# Patient Record
Sex: FEMALE | Race: BLACK OR AFRICAN AMERICAN | NOT HISPANIC OR LATINO | Employment: UNEMPLOYED | URBAN - METROPOLITAN AREA
[De-identification: names, ages, dates, MRNs, and addresses within clinical notes are randomized per-mention and may not be internally consistent; named-entity substitution may affect disease eponyms.]

---

## 2021-08-21 ENCOUNTER — HISTORICAL (OUTPATIENT)
Dept: INFECTIOUS DISEASES | Facility: HOSPITAL | Age: 26
End: 2021-08-21

## 2022-04-10 ENCOUNTER — HISTORICAL (OUTPATIENT)
Dept: ADMINISTRATIVE | Facility: HOSPITAL | Age: 27
End: 2022-04-10

## 2022-04-28 VITALS
SYSTOLIC BLOOD PRESSURE: 128 MMHG | DIASTOLIC BLOOD PRESSURE: 75 MMHG | HEIGHT: 65 IN | WEIGHT: 273.94 LBS | BODY MASS INDEX: 45.64 KG/M2

## 2022-12-31 LAB — GLUCOSE SERPL-MCNC: 229 MG/DL

## 2023-01-04 LAB
ABO AND RH: NORMAL
C TRACH RRNA SPEC QL PROBE: NEGATIVE
HBV SURFACE AG SERPL QL IA: NEGATIVE
HIV 1+2 AB+HIV1 P24 AG SERPL QL IA: NORMAL
N GONORRHOEA DNA SPEC QL NAA+PROBE: NEGATIVE
RPR: NEGATIVE
RUBELLA IMMUNE STATUS: NORMAL

## 2023-01-12 LAB
PRENATAL STREP B CULTURE: POSITIVE
PRENATAL STREP B CULTURE: POSITIVE

## 2023-05-23 VITALS
WEIGHT: 293 LBS | SYSTOLIC BLOOD PRESSURE: 142 MMHG | DIASTOLIC BLOOD PRESSURE: 86 MMHG | BODY MASS INDEX: 48.82 KG/M2 | HEIGHT: 65 IN

## 2023-05-23 DIAGNOSIS — O99.283 HYPOTHYROIDISM DURING PREGNANCY IN THIRD TRIMESTER: ICD-10-CM

## 2023-05-23 DIAGNOSIS — O99.210 OBESITY IN PREGNANCY: ICD-10-CM

## 2023-05-23 DIAGNOSIS — O10.919 HTN IN PREGNANCY, CHRONIC: ICD-10-CM

## 2023-05-23 DIAGNOSIS — E03.9 HYPOTHYROIDISM DURING PREGNANCY IN THIRD TRIMESTER: ICD-10-CM

## 2023-05-23 DIAGNOSIS — O24.113 PRE-EXISTING TYPE 2 DIABETES MELLITUS DURING PREGNANCY IN THIRD TRIMESTER: ICD-10-CM

## 2023-05-23 DIAGNOSIS — O34.219 DELIVERY WITH HISTORY OF C-SECTION: ICD-10-CM

## 2023-05-23 DIAGNOSIS — O24.113 PRE-EXISTING TYPE 2 DIABETES MELLITUS DURING PREGNANCY IN THIRD TRIMESTER: Primary | ICD-10-CM

## 2023-05-23 RX ORDER — LEVOTHYROXINE SODIUM 25 UG/1
25 TABLET ORAL
Status: ON HOLD | COMMUNITY
End: 2023-06-24 | Stop reason: SDUPTHER

## 2023-05-23 RX ORDER — LABETALOL 100 MG/1
100 TABLET, FILM COATED ORAL 2 TIMES DAILY
COMMUNITY
Stop reason: DRUGHIGH

## 2023-05-23 RX ORDER — SYRINGE,SAFETY WITH NEEDLE,1ML 25GX1"
SYRINGE (EA) MISCELLANEOUS
COMMUNITY

## 2023-05-23 RX ORDER — NAPROXEN SODIUM 220 MG/1
81 TABLET, FILM COATED ORAL DAILY
COMMUNITY

## 2023-05-23 NOTE — PROGRESS NOTES
23    27-year-old female  at 30 6/7 weeks gestation with EDC of 2023 by LMP, consistent with early US. Her chronic medical, obstetrical or fetal conditions that affect the pregnancy include the following: She has type 2 diabetes. She was recently hospitalized and discharged on 38NPH and 24 Humalog in am, 24 Hum with dinner and 24 NPH at bedtime. She also has corrective dose of 1 unit humalog for every 5 mg above 130. She brought a log for review.On 23, Hgba1c at 7.3%. She missed fetal echo appointment and states she has not had a chance to reschedule.She has CHTN. She is on labetalol 100mg bid and low dose aspirin TWICE daily. 1-10-23, 24 hour urine with 117.7mg protein. She had elevated BMI over 50 at consult visit.On 23,TSH at 3.188. She was advised to startsynthroid 25 mcg daily, but she reports that she was not given that medication from the pharmacy.Currently, she voices no complaints and denies any abdominal cramping, leaking of fluid, vaginal bleeding or vaginal discharge. Patient also reports no headaches, visual problems or epigastric pain. FOB present for visit. Patient is having rib and back pain.  Diagnosis & Plan  1) Z3A.30 Weeks of gestation; 30 weeks gestation of pregnancy  2) O99.213 Other maternal diseases classifiable elsewhere but complicating pregnancy, childbirth and the puerperium; Obesity complicating pregnancy, third trimester  3) O24.113 Diabetes mellitus in pregnancy, childbirth, and the puerperium; Pre-existing type 2 diabetes mellitus, in pregnancy, third trimester  4) O10.013 Pre-existing hypertension complicating pregnancy, childbirth and the puerperium; Pre-existing essential hypertension complicating pregnancy, third trimester  5) E66.01 Overweight and obesity; Morbid (severe) obesity due to excess calories  6) O99.283 Other maternal diseases classifiable elsewhere but complicating pregnancy, childbirth and the puerperium; Endocrine, nutritional and  metabolic diseases complicating pregnancy, third trimester  General Plan  30 6/7 weeks gestation with    1. Type 2 diabetes with excessive fetal fetal growth (2165g at 79%, AC greater than 97% on 23), normal IVON (14.1cm), BPP     Reviewed risks associated with diabetes in pregnancy including higher risk for polyhydramnios, fetal macrosomia and  metabolic complications (hypoglycemia, hyperbilirubinemia, hypocalcemia, erythema).    With risk of congenital heart defect, and poor views of heart recommended fetal echo. She cancelled appt again, urged to call and reschedule and KEEP APPOINTMENT    Continue 2200 calorie ADA diet during pregnancy. It is recommended that she have 30 grams of carbohydrates with breakfast, and 60 grams with lunch and dinner. In addition, she should have three snacks in between meals with 15 grams of carbohydrates and at bedtime with 30 grams of carbohydrates.  Dietary advice reviewed again    No log for review as she is not checking. We contacted pharmacy to ensure she will be able to get glucometer and insulin today. Urged her to avoid going without any amount of time without insulin or being able to monitor glucose. Advised to adjust to 38 NPH and 24 Humalog in am, 24 Hum with dinner and 36 NPH at bedtime. She was give corrective dose of 1 unit humalog for every 5 mg above 130.    Discussed risks of hyperglycemia, including risk of FDIU. With previous discrepancy of blood glucose values and reported values and noncompliance, recommended she go to hospital today for glycemic control. She declined and voiced understanding of risks of uncontrolled diabetes. Advised to continue to follow strict diabetic diet at all times. Advised to check glucose 4/day and CALL DAILY with values.    Continue low dose ASA BID until 34-35 weeks, then return to once daily dosing after that until delivery.    She would benefit from having serial US every four weeks until the end of the pregnancy in  view of the risk of excessive fetal growth and normalization of fetal growth being a reflection of adequacy see of blood sugar control. Recommend fetal testing at this time, increased to twice weekly, alternating with primary OB at 32 weeks.    She needs to do fetal kick counts throughout the pregnancy, with immediate reporting of any decreased fetal movement.    2. chronic hypertension  Chronic hypertension complicates up to 2-5% of pregnancies and is associated with significant adverse pregnancy outcomes including  birth, fetal growth restriction, fetal demise, placental abruption, and  delivery.    With blood pressure 142/86, she was advised to continue labetalol 100mg bid. Continue low sodium diet avoiding any excessive weight gain.    Continue low dose asa as above. Reviewed preeclampsia precautions.    See fetal surveillance above.    Among patients with uncomplicated chronic hypertension with no additional risk factors, delivery at 38-39 weeks appears to provide optimal balance between the risk of adverse fetal and  outcomes. HOWEVER, with multiple risks, will reassess closer to EDC to determine optimal timeframe or GA for delivery, based on current evaluation at that time.    3. elevated BMI over 50 at consult visit, previous excessive gain, currently stable    Continue to follow low calorie, low fat, low Na, diabetic diet avoiding any additional excessive weight gain. Excess weight gain would be associated with worsening hypertension, worsening diabetes and adverse  outcomes, including fetal demise in utero.    The risks of  section in the massively obese patient (BMI greater than 50) could be around 60%. The risk of complications in these  sections is substantial, including 30% risk of wound complications, mostly in the form of wound disruptions, with majority (85%) diagnosed after hospital discharge.    Preoperative antibiotics and thromboprophylaxis with  use if pneumatic compression devices is recommended in those undergoing  delivery. Thromboprophylaxis should also be use with those on prolonged immobilization.    4. Hypothyroidism  Reviewed with her the likelihood of higher dose of Synthroid to be needed during the pregnancy with maternal hypothyroidism. The importance of maintaining a euthyroid status for optimal pregnancy outcome was discussed to decrease the risk of adverse pregnancy outcome with uncontrolled hypothyroidism. Discussed that thyroxine (T4) is needed for fetal brain development. Also, high hcg levels in first trimester, can cause decreased TSH and need to adjust accordingly. Risks associated with uncontrolled hypothyroidism, include potential for pregnancy loss, neurologic/cognitive deficit in the baby,  delivery, LBW,  delivery, and gestational hypertension/preeclampsia    Currently, it is recommended to do trimester specific thyroid testing monitoring in pregnancy with TSH for adequate management and dosing of medication. Thyroid levels for  1st trimester: TSH - 0.1- 2.5  2nd trimester: TSH 0.2- 3.0  3rd trimester: TSH 0.3- 3.0  Free T4 levels may be unreliable in pregnancy, in addition to varying lab reference ranges. Although, abnormality 1.5 fold outside of reference range is suggestive of abnormality.    The prognosis should be quite good with adequately controlled hypothyroidism on medicine.    Urged to continue Synthroid 25mcg daily.    Recommend recheck TSH in about 3 weeks. If it is normal, then I suggest rechecking the TSH every two months in the pregnancy and if adjustment of dose of Synthroid is needed, then a repeat TSH could be done in three weeks after that. She was instructed to report any symptoms of cold/heat intolerance, heart palpitations, fatigue or constipation    5. Muskuloskeletal pain, supportive care measures discussed.  Return Visit:- 1 Week    -Reason:- MFM Follow Up BPP

## 2023-06-06 ENCOUNTER — TELEPHONE (OUTPATIENT)
Dept: MATERNAL FETAL MEDICINE | Facility: CLINIC | Age: 28
End: 2023-06-06

## 2023-06-06 ENCOUNTER — PROCEDURE VISIT (OUTPATIENT)
Dept: MATERNAL FETAL MEDICINE | Facility: CLINIC | Age: 28
End: 2023-06-06
Payer: MEDICAID

## 2023-06-06 ENCOUNTER — OFFICE VISIT (OUTPATIENT)
Dept: MATERNAL FETAL MEDICINE | Facility: CLINIC | Age: 28
End: 2023-06-06
Payer: MEDICAID

## 2023-06-06 VITALS
BODY MASS INDEX: 48.82 KG/M2 | HEART RATE: 83 BPM | HEIGHT: 65 IN | SYSTOLIC BLOOD PRESSURE: 150 MMHG | DIASTOLIC BLOOD PRESSURE: 95 MMHG | WEIGHT: 293 LBS

## 2023-06-06 DIAGNOSIS — O99.213 OBESITY COMPLICATING PREGNANCY IN THIRD TRIMESTER: ICD-10-CM

## 2023-06-06 DIAGNOSIS — O99.283 HYPOTHYROIDISM DURING PREGNANCY IN THIRD TRIMESTER: ICD-10-CM

## 2023-06-06 DIAGNOSIS — O99.210 OBESITY IN PREGNANCY: ICD-10-CM

## 2023-06-06 DIAGNOSIS — O10.919 HTN IN PREGNANCY, CHRONIC: Primary | ICD-10-CM

## 2023-06-06 DIAGNOSIS — O10.013 PRE-EXISTING ESSENTIAL HYPERTENSION COMPLICATING PREGNANCY IN THIRD TRIMESTER: ICD-10-CM

## 2023-06-06 DIAGNOSIS — O16.3 HYPERTENSION COMPLICATING PREGNANCY, THIRD TRIMESTER: ICD-10-CM

## 2023-06-06 DIAGNOSIS — O10.919 HTN IN PREGNANCY, CHRONIC: ICD-10-CM

## 2023-06-06 DIAGNOSIS — E03.9 HYPOTHYROIDISM DURING PREGNANCY IN THIRD TRIMESTER: ICD-10-CM

## 2023-06-06 DIAGNOSIS — O34.219 DELIVERY WITH HISTORY OF C-SECTION: ICD-10-CM

## 2023-06-06 DIAGNOSIS — O24.113 PRE-EXISTING TYPE 2 DIABETES MELLITUS DURING PREGNANCY IN THIRD TRIMESTER: ICD-10-CM

## 2023-06-06 DIAGNOSIS — O35.BXX1: ICD-10-CM

## 2023-06-06 DIAGNOSIS — O24.113 PRE-EXISTING TYPE 2 DIABETES MELLITUS DURING PREGNANCY IN THIRD TRIMESTER: Primary | ICD-10-CM

## 2023-06-06 PROCEDURE — 3080F PR MOST RECENT DIASTOLIC BLOOD PRESSURE >= 90 MM HG: ICD-10-PCS | Mod: CPTII,S$GLB,, | Performed by: OBSTETRICS & GYNECOLOGY

## 2023-06-06 PROCEDURE — 76819 PR US, OB, FETAL BIOPHYSICAL, W/O NST: ICD-10-PCS | Mod: S$GLB,,, | Performed by: OBSTETRICS & GYNECOLOGY

## 2023-06-06 PROCEDURE — 99214 PR OFFICE/OUTPT VISIT, EST, LEVL IV, 30-39 MIN: ICD-10-PCS | Mod: TH,S$GLB,, | Performed by: OBSTETRICS & GYNECOLOGY

## 2023-06-06 PROCEDURE — 76816 PR  US,PREGNANT UTERUS,F/U,TRANSABD APP: ICD-10-PCS | Mod: S$GLB,,, | Performed by: OBSTETRICS & GYNECOLOGY

## 2023-06-06 PROCEDURE — 3008F BODY MASS INDEX DOCD: CPT | Mod: CPTII,S$GLB,, | Performed by: OBSTETRICS & GYNECOLOGY

## 2023-06-06 PROCEDURE — 1159F PR MEDICATION LIST DOCUMENTED IN MEDICAL RECORD: ICD-10-PCS | Mod: CPTII,S$GLB,, | Performed by: OBSTETRICS & GYNECOLOGY

## 2023-06-06 PROCEDURE — 3077F SYST BP >= 140 MM HG: CPT | Mod: CPTII,S$GLB,, | Performed by: OBSTETRICS & GYNECOLOGY

## 2023-06-06 PROCEDURE — 76819 FETAL BIOPHYS PROFIL W/O NST: CPT | Mod: S$GLB,,, | Performed by: OBSTETRICS & GYNECOLOGY

## 2023-06-06 PROCEDURE — 3077F PR MOST RECENT SYSTOLIC BLOOD PRESSURE >= 140 MM HG: ICD-10-PCS | Mod: CPTII,S$GLB,, | Performed by: OBSTETRICS & GYNECOLOGY

## 2023-06-06 PROCEDURE — 3080F DIAST BP >= 90 MM HG: CPT | Mod: CPTII,S$GLB,, | Performed by: OBSTETRICS & GYNECOLOGY

## 2023-06-06 PROCEDURE — 76816 OB US FOLLOW-UP PER FETUS: CPT | Mod: S$GLB,,, | Performed by: OBSTETRICS & GYNECOLOGY

## 2023-06-06 PROCEDURE — 1159F MED LIST DOCD IN RCRD: CPT | Mod: CPTII,S$GLB,, | Performed by: OBSTETRICS & GYNECOLOGY

## 2023-06-06 PROCEDURE — 99214 OFFICE O/P EST MOD 30 MIN: CPT | Mod: TH,S$GLB,, | Performed by: OBSTETRICS & GYNECOLOGY

## 2023-06-06 PROCEDURE — 3008F PR BODY MASS INDEX (BMI) DOCUMENTED: ICD-10-PCS | Mod: CPTII,S$GLB,, | Performed by: OBSTETRICS & GYNECOLOGY

## 2023-06-06 RX ORDER — LABETALOL 200 MG/1
200 TABLET, FILM COATED ORAL EVERY 12 HOURS
Qty: 60 TABLET | Refills: 3 | Status: ON HOLD | OUTPATIENT
Start: 2023-06-06 | End: 2023-06-24 | Stop reason: SDUPTHER

## 2023-06-06 NOTE — ASSESSMENT & PLAN NOTE
Chronic hypertension  Chronic hypertension complicates up to 2-5% of pregnancies and is associated with significant adverse pregnancy outcomes including  birth, fetal growth restriction, fetal demise, placental abruption, and  delivery.     With blood pressure today 150/95, she was advised to adjust labetalol to 200mg bid. Continue low sodium diet avoiding any excessive weight gain.     Continue low dose asa as above. Reviewed preeclampsia precautions.     See fetal surveillance above.     With chronic hypertension requiring higher dose of medication and type 2 diabetes mellitus with BMI over 55 recommend delivery no later than 37 weeks' gestation.  Place of delivery will depend on evaluation by pediatric Cardiology for fetal heart.

## 2023-06-06 NOTE — ASSESSMENT & PLAN NOTE
Continued excessive fetal growth (3436g at 85%, AC 99% on 23), Normal IVON, BPP 8/8. 4 chamber heart view with left ventricle appearing smaller than right ventricle.     Reviewed risks associated with diabetes in pregnancy including higher risk for polyhydramnios, fetal macrosomia and  metabolic complications (hypoglycemia, hyperbilirubinemia, hypocalcemia, erythema).    With risk of congenital heart defect, and poor views of heart recommended fetal echo. She cancelled appt again, urged to call and reschedule and KEEP APPOINTMENT    Continue 2200 calorie ADA diet during pregnancy. It is recommended that she have 30 grams of carbohydrates with breakfast, and 60 grams with lunch and dinner. In addition, she should have three snacks in between meals with 15 grams of carbohydrates and at bedtime with 30 grams of carbohydrates.  Dietary advice reviewed again    No log for review as she is not checking. We contacted pharmacy to ensure she will be able to get glucometer and insulin today. Urged her to avoid going without any amount of time without insulin or being able to monitor glucose. Advised to adjust to 38 NPH and 24 Humalog in am, 24 Hum with dinner and 44 NPH at bedtime. She was give corrective dose of 1 unit humalog for every 5 mg above 130.    Discussed risks of hyperglycemia, including risk of FDIU. With previous discrepancy of blood glucose values and reported values and noncompliance, recommended she go to hospital today for glycemic control. She declined and voiced understanding of risks of uncontrolled diabetes. Advised to continue to follow strict diabetic diet at all times. Advised to check glucose 4/day and CALL DAILY with values.    Will decrease low-dose aspirin to daily until delivery.

## 2023-06-06 NOTE — ASSESSMENT & PLAN NOTE
Elevated BMI over 50 at consult visit now at 55  Previous excessive gain, currently stable     Continue to follow low calorie, low fat, low Na, diabetic diet avoiding any additional excessive weight gain. Excess weight gain would be associated with worsening hypertension, worsening diabetes and adverse  outcomes, including fetal demise in utero.     The risks of  section in the massively obese patient (BMI greater than 50) could be around 60%. The risk of complications in these  sections is substantial, including 30% risk of wound complications, mostly in the form of wound disruptions, with majority (85%) diagnosed after hospital discharge.     Preoperative antibiotics and thromboprophylaxis with use if pneumatic compression devices is recommended in those undergoing  delivery. Thromboprophylaxis should also be use with those on prolonged immobilization.

## 2023-06-06 NOTE — PROGRESS NOTES
"Maternal Fetal Medicine follow up consult    Subjective     Patient ID: Estephanie Johansen is a 28 y.o. female  at 34w6d gestation with Estimated Date of Delivery: 23  who is here for follow  up consultation by MFM.    Chief Complaint: MFM FOLLOW UP W/REPEAT US       Pregnancy complications include:   Patient Active Problem List   Diagnosis    Pre-existing essential hypertension complicating pregnancy in third trimester    Pre-existing type 2 diabetes mellitus during pregnancy in third trimester    Significantly increased BMI complicating pregnancy in third trimester    Hypothyroidism during pregnancy in third trimester    Delivery with history of     Congenital heart disease of fetus affecting antepartum care of mother, fetus 1        No changes to medical, surgical, family, social, or obstetric history.    Interval history since last MFM visit: None.    HPI She denies any leaking fluid, vaginal bleeding, contractions, decreased fetal movement. Denies headaches, visual disturbances, or epigastric pain    Medications:  Current Outpatient Medications   Medication Instructions    aspirin 81 mg, Oral, Daily    blood sugar diagnostic (TRUE METRIX GLUCOSE TEST STRIP MISC) Misc.(Non-Drug; Combo Route)    insulin lispro protamine-insulin lispro (HUMALOG 75/25) 100 unit/mL (75-25) Susp Subcutaneous, Inject 20 units in AM and inject 26 units at supper    insulin regular 100 unit/mL Inj injection Subcutaneous, inject 46 units in AM and inject 54 units at bedtime    insulin syringe-needle U-100 1 mL 29 gauge x 1/2" Syrg Misc.(Non-Drug; Combo Route)    LANCETS,THIN MISC Misc.(Non-Drug; Combo Route)    levothyroxine (SYNTHROID) 25 mcg, Oral, Before breakfast       Review of Systems   Constitutional:  Negative for activity change.   Eyes:  Negative for visual disturbance.   Respiratory: Negative.     Cardiovascular:  Negative for leg swelling.   Gastrointestinal:  Negative for abdominal pain, constipation, " diarrhea, nausea, vomiting and reflux.   Genitourinary:  Negative for bladder incontinence, frequency, pelvic pain, vaginal bleeding and vaginal discharge.        Good fetal movements   Musculoskeletal:  Negative for back pain.   Neurological:  Negative for headaches.   All other systems reviewed and are negative.        Objective     Physical Exam       Assessment and Plan     ASSESSMENT/PLAN:     28 y.o.  female with IUP at 34w6d    Pre-existing type 2 diabetes mellitus during pregnancy in third trimester  Continued excessive fetal growth (3436g at 85%, AC 99% on 23), Normal IVON, BPP 8/8. 4 chamber heart view with left ventricle appearing smaller than right ventricle.     Reviewed risks associated with diabetes in pregnancy including higher risk for polyhydramnios, fetal macrosomia and  metabolic complications (hypoglycemia, hyperbilirubinemia, hypocalcemia, erythema).    With risk of congenital heart defect, and poor views of heart recommended fetal echo. She cancelled appt again, urged to call and reschedule and KEEP APPOINTMENT    Continue 2200 calorie ADA diet during pregnancy. It is recommended that she have 30 grams of carbohydrates with breakfast, and 60 grams with lunch and dinner. In addition, she should have three snacks in between meals with 15 grams of carbohydrates and at bedtime with 30 grams of carbohydrates.  Dietary advice reviewed again    No log for review as she is not checking. We contacted pharmacy to ensure she will be able to get glucometer and insulin today. Urged her to avoid going without any amount of time without insulin or being able to monitor glucose. Advised to adjust to 38 NPH and 24 Humalog in am, 24 Hum with dinner and 44 NPH at bedtime. She was give corrective dose of 1 unit humalog for every 5 mg above 130.    Discussed risks of hyperglycemia, including risk of FDIU. With previous discrepancy of blood glucose values and reported values and noncompliance,  recommended she go to hospital today for glycemic control. She declined and voiced understanding of risks of uncontrolled diabetes. Advised to continue to follow strict diabetic diet at all times. Advised to check glucose 4/day and CALL DAILY with values.    Will decrease low-dose aspirin to daily until delivery.      Pre-existing essential hypertension complicating pregnancy in third trimester  Chronic hypertension  Chronic hypertension complicates up to 2-5% of pregnancies and is associated with significant adverse pregnancy outcomes including  birth, fetal growth restriction, fetal demise, placental abruption, and  delivery.     With blood pressure today 150/95, she was advised to adjust labetalol to 200mg bid. Continue low sodium diet avoiding any excessive weight gain.     Continue low dose asa as above. Reviewed preeclampsia precautions.     See fetal surveillance above.     With chronic hypertension requiring higher dose of medication and type 2 diabetes mellitus with BMI over 55 recommend delivery no later than 37 weeks' gestation.  Place of delivery will depend on evaluation by pediatric Cardiology for fetal heart.    Significantly increased BMI complicating pregnancy in third trimester  Elevated BMI over 50 at consult visit now at 55  Previous excessive gain, currently stable     Continue to follow low calorie, low fat, low Na, diabetic diet avoiding any additional excessive weight gain. Excess weight gain would be associated with worsening hypertension, worsening diabetes and adverse  outcomes, including fetal demise in utero.     The risks of  section in the massively obese patient (BMI greater than 50) could be around 60%. The risk of complications in these  sections is substantial, including 30% risk of wound complications, mostly in the form of wound disruptions, with majority (85%) diagnosed after hospital discharge.     Preoperative antibiotics and  thromboprophylaxis with use if pneumatic compression devices is recommended in those undergoing  delivery. Thromboprophylaxis should also be use with those on prolonged immobilization.    Hypothyroidism during pregnancy in third trimester  Reviewed with her the likelihood of higher dose of Synthroid to be needed during the pregnancy with maternal hypothyroidism. The importance of maintaining a euthyroid status for optimal pregnancy outcome was discussed to decrease the risk of adverse pregnancy outcome with uncontrolled hypothyroidism. Discussed that thyroxine (T4) is needed for fetal brain development. Also, high hcg levels in first trimester, can cause decreased TSH and need to adjust accordingly. Risks associated with uncontrolled hypothyroidism, include potential for pregnancy loss, neurologic/cognitive deficit in the baby,  delivery, LBW,  delivery, and gestational hypertension/preeclampsia     Currently, it is recommended to do trimester specific thyroid testing monitoring in pregnancy with TSH for adequate management and dosing of medication. Thyroid levels for  1st trimester: TSH - 0.1- 2.5  2nd trimester: TSH 0.2- 3.0  3rd trimester: TSH 0.3- 3.0  Free T4 levels may be unreliable in pregnancy, in addition to varying lab reference ranges. Although, abnormality 1.5 fold outside of reference range is suggestive of abnormality.     The prognosis should be quite good with adequately controlled hypothyroidism on medicine.     Advised to continue Synthroid 25mcg daily.      RECOMMEND TSH IF NOT DONE RECENTLY.  She was instructed to report any symptoms of cold/heat intolerance, heart palpitations, fatigue or constipation    Congenital heart disease of fetus affecting antepartum care of mother, fetus 1  LEFT VENTRICLE APPEARS TO BE SMALLER THAN THE RIGHT VENTRICLE.  WE HAVE NOT BEEN ABLE TO VISUALIZE FETAL HEART ON PREVIOUS ULTRASOUNDS AND HAVE RESCHEDULED FETAL ECHO SEVERAL TIMES.  URGED HER  TO DO THE FETAL ECHO AS SOON AS POSSIBLE.  DISCUSSED WITH DR. MAXWELL WILL TRY TO ACCOMMODATE HER IN VIEW OF THE ADVANCED GESTATIONAL AGE WITHIN THE NEXT FEW DAYS IF SHE AGREES TO GO.  EMPHASIZED THE IMPORTANCE OF HAVING CLARITY ON THE DIAGNOSIS OF FETAL CONDITION AND HEART AND THE PATIENT WOULD BENEFIT FROM DELIVERY IN A TERTIARY FACILITY WITH A MINIMUM OF NICU AND PEDIATRIC CARDIOLOGY IF HEART IS TRULY ABNORMAL.      F/u in 1 weeks for MFM visit  F/u in 1 weeks for US      Components of this note were documented using voice recognition systems; and are subject to errors not corrected at proof reading.  Please contact the author for any clarifications.

## 2023-06-06 NOTE — ASSESSMENT & PLAN NOTE
LEFT VENTRICLE APPEARS TO BE SMALLER THAN THE RIGHT VENTRICLE.  WE HAVE NOT BEEN ABLE TO VISUALIZE FETAL HEART ON PREVIOUS ULTRASOUNDS AND HAVE RESCHEDULED FETAL ECHO SEVERAL TIMES.  URGED HER TO DO THE FETAL ECHO AS SOON AS POSSIBLE.  DISCUSSED WITH DR. MAXWELL WILL TRY TO ACCOMMODATE HER IN VIEW OF THE ADVANCED GESTATIONAL AGE WITHIN THE NEXT FEW DAYS IF SHE AGREES TO GO.  EMPHASIZED THE IMPORTANCE OF HAVING CLARITY ON THE DIAGNOSIS OF FETAL CONDITION AND HEART AND THE PATIENT WOULD BENEFIT FROM DELIVERY IN A TERTIARY FACILITY WITH A MINIMUM OF NICU AND PEDIATRIC CARDIOLOGY IF HEART IS TRULY ABNORMAL.

## 2023-06-06 NOTE — ASSESSMENT & PLAN NOTE
Reviewed with her the likelihood of higher dose of Synthroid to be needed during the pregnancy with maternal hypothyroidism. The importance of maintaining a euthyroid status for optimal pregnancy outcome was discussed to decrease the risk of adverse pregnancy outcome with uncontrolled hypothyroidism. Discussed that thyroxine (T4) is needed for fetal brain development. Also, high hcg levels in first trimester, can cause decreased TSH and need to adjust accordingly. Risks associated with uncontrolled hypothyroidism, include potential for pregnancy loss, neurologic/cognitive deficit in the baby,  delivery, LBW,  delivery, and gestational hypertension/preeclampsia     Currently, it is recommended to do trimester specific thyroid testing monitoring in pregnancy with TSH for adequate management and dosing of medication. Thyroid levels for  1st trimester: TSH - 0.1- 2.5  2nd trimester: TSH 0.2- 3.0  3rd trimester: TSH 0.3- 3.0  Free T4 levels may be unreliable in pregnancy, in addition to varying lab reference ranges. Although, abnormality 1.5 fold outside of reference range is suggestive of abnormality.     The prognosis should be quite good with adequately controlled hypothyroidism on medicine.     Advised to continue Synthroid 25mcg daily.      RECOMMEND TSH IF NOT DONE RECENTLY.  She was instructed to report any symptoms of cold/heat intolerance, heart palpitations, fatigue or constipation

## 2023-06-13 ENCOUNTER — OFFICE VISIT (OUTPATIENT)
Dept: MATERNAL FETAL MEDICINE | Facility: CLINIC | Age: 28
End: 2023-06-13
Payer: MEDICAID

## 2023-06-13 ENCOUNTER — PROCEDURE VISIT (OUTPATIENT)
Dept: MATERNAL FETAL MEDICINE | Facility: CLINIC | Age: 28
End: 2023-06-13
Payer: MEDICAID

## 2023-06-13 VITALS
HEART RATE: 96 BPM | HEIGHT: 65 IN | SYSTOLIC BLOOD PRESSURE: 117 MMHG | BODY MASS INDEX: 48.82 KG/M2 | DIASTOLIC BLOOD PRESSURE: 77 MMHG | WEIGHT: 293 LBS

## 2023-06-13 DIAGNOSIS — O99.283 HYPOTHYROIDISM DURING PREGNANCY IN THIRD TRIMESTER: ICD-10-CM

## 2023-06-13 DIAGNOSIS — O10.919 HTN IN PREGNANCY, CHRONIC: ICD-10-CM

## 2023-06-13 DIAGNOSIS — O24.113 PRE-EXISTING TYPE 2 DIABETES MELLITUS DURING PREGNANCY IN THIRD TRIMESTER: Primary | ICD-10-CM

## 2023-06-13 DIAGNOSIS — E03.9 HYPOTHYROIDISM DURING PREGNANCY IN THIRD TRIMESTER: ICD-10-CM

## 2023-06-13 DIAGNOSIS — O10.013 PRE-EXISTING ESSENTIAL HYPERTENSION COMPLICATING PREGNANCY IN THIRD TRIMESTER: ICD-10-CM

## 2023-06-13 DIAGNOSIS — O99.213 OBESITY COMPLICATING PREGNANCY IN THIRD TRIMESTER: ICD-10-CM

## 2023-06-13 DIAGNOSIS — O35.BXX1: ICD-10-CM

## 2023-06-13 PROCEDURE — 1159F PR MEDICATION LIST DOCUMENTED IN MEDICAL RECORD: ICD-10-PCS | Mod: CPTII,S$GLB,, | Performed by: OBSTETRICS & GYNECOLOGY

## 2023-06-13 PROCEDURE — 1159F MED LIST DOCD IN RCRD: CPT | Mod: CPTII,S$GLB,, | Performed by: OBSTETRICS & GYNECOLOGY

## 2023-06-13 PROCEDURE — 76819 PR US, OB, FETAL BIOPHYSICAL, W/O NST: ICD-10-PCS | Mod: S$GLB,,, | Performed by: OBSTETRICS & GYNECOLOGY

## 2023-06-13 PROCEDURE — 99214 PR OFFICE/OUTPT VISIT, EST, LEVL IV, 30-39 MIN: ICD-10-PCS | Mod: TH,25,S$GLB, | Performed by: OBSTETRICS & GYNECOLOGY

## 2023-06-13 PROCEDURE — 76819 FETAL BIOPHYS PROFIL W/O NST: CPT | Mod: S$GLB,,, | Performed by: OBSTETRICS & GYNECOLOGY

## 2023-06-13 PROCEDURE — 3074F PR MOST RECENT SYSTOLIC BLOOD PRESSURE < 130 MM HG: ICD-10-PCS | Mod: CPTII,S$GLB,, | Performed by: OBSTETRICS & GYNECOLOGY

## 2023-06-13 PROCEDURE — 3008F BODY MASS INDEX DOCD: CPT | Mod: CPTII,S$GLB,, | Performed by: OBSTETRICS & GYNECOLOGY

## 2023-06-13 PROCEDURE — 3074F SYST BP LT 130 MM HG: CPT | Mod: CPTII,S$GLB,, | Performed by: OBSTETRICS & GYNECOLOGY

## 2023-06-13 PROCEDURE — 3078F DIAST BP <80 MM HG: CPT | Mod: CPTII,S$GLB,, | Performed by: OBSTETRICS & GYNECOLOGY

## 2023-06-13 PROCEDURE — 99214 OFFICE O/P EST MOD 30 MIN: CPT | Mod: TH,25,S$GLB, | Performed by: OBSTETRICS & GYNECOLOGY

## 2023-06-13 PROCEDURE — 3008F PR BODY MASS INDEX (BMI) DOCUMENTED: ICD-10-PCS | Mod: CPTII,S$GLB,, | Performed by: OBSTETRICS & GYNECOLOGY

## 2023-06-13 PROCEDURE — 3078F PR MOST RECENT DIASTOLIC BLOOD PRESSURE < 80 MM HG: ICD-10-PCS | Mod: CPTII,S$GLB,, | Performed by: OBSTETRICS & GYNECOLOGY

## 2023-06-13 RX ORDER — BUTALBITAL, ACETAMINOPHEN AND CAFFEINE 50; 325; 40 MG/1; MG/1; MG/1
1 TABLET ORAL
COMMUNITY
Start: 2023-04-21 | End: 2023-06-13

## 2023-06-13 RX ORDER — PEN NEEDLE, DIABETIC 29 G X1/2"
1 NEEDLE, DISPOSABLE MISCELLANEOUS 4 TIMES DAILY
COMMUNITY
Start: 2023-02-10

## 2023-06-13 RX ORDER — LANCETS
EACH MISCELLANEOUS
COMMUNITY
Start: 2023-04-26 | End: 2023-06-13

## 2023-06-13 RX ORDER — CALCIUM CITRATE/VITAMIN D3 200MG-6.25
1 TABLET ORAL 4 TIMES DAILY
COMMUNITY
Start: 2023-04-21

## 2023-06-13 RX ORDER — BLOOD-GLUCOSE METER
EACH MISCELLANEOUS
COMMUNITY
Start: 2023-04-28

## 2023-06-13 RX ORDER — FERROUS SULFATE 325(65) MG
1 TABLET ORAL DAILY
COMMUNITY
Start: 2023-04-13

## 2023-06-13 RX ORDER — INSULIN LISPRO 100 [IU]/ML
INJECTION, SOLUTION INTRAVENOUS; SUBCUTANEOUS
Status: ON HOLD | COMMUNITY
Start: 2023-04-24 | End: 2023-06-24 | Stop reason: HOSPADM

## 2023-06-13 RX ORDER — INSULIN HUMAN 100 [IU]/ML
INJECTION, SUSPENSION SUBCUTANEOUS
Status: ON HOLD | COMMUNITY
Start: 2023-04-25 | End: 2023-06-24 | Stop reason: HOSPADM

## 2023-06-14 DIAGNOSIS — O35.BXX0 ANOMALY OF HEART OF FETUS AFFECTING PREGNANCY, ANTEPARTUM, SINGLE OR UNSPECIFIED FETUS: ICD-10-CM

## 2023-06-14 DIAGNOSIS — Z3A.37 37 WEEKS GESTATION OF PREGNANCY: Primary | ICD-10-CM

## 2023-06-14 NOTE — PROGRESS NOTES
Received request from Dr Myles to schedule this patient for a repeat  delivery at 37 weeks (8 days from now) with IDDM, fetal left ventricular hypertrophy evaluated by peds cards, obesity, prior  X 2

## 2023-06-14 NOTE — ASSESSMENT & PLAN NOTE
Concentric left ventricular hypertrophy of fetus affecting antepartum care of mother, fetus 1  She had fetal echo done on 23 with mild concentric LVH without any outflow tract obstruction. Normal size of RV, VSD cannot be ruled out. Per note, recommendation for delivery in facility with ability to do  echocardiogram and involve cardiology if needed.     Discussed with Dr. Kam to have  moved to [place] with  evaluation recommendation per Dr. De Los Santos.

## 2023-06-14 NOTE — ASSESSMENT & PLAN NOTE
Chronic hypertension complicates up to 2-5% of pregnancies and is associated with significant adverse pregnancy outcomes including  birth, fetal growth restriction, fetal demise, placental abruption, and  delivery.     With blood pressure today BP: 117/77  , she was advised to continue labetalol 200mg bid. Continue low sodium diet avoiding any excessive weight gain.     Continue low dose asa as discussed in plan elsewhere. Reviewed preeclampsia precautions.     See fetal surveillance as discussed in plan elsewhere.     With chronic hypertension requiring higher dose of medication and type 2 diabetes mellitus with BMI over 55 recommend delivery no later than 37 weeks' gestation, at that would present the best balance between prematurity risks and risk of continued pregnancy.  Patient needs to deliver in [place] with the available NICU and pediatric Cardiology because of the concentric left ventricular hypertrophy noted on fetal echocardiogram

## 2023-06-14 NOTE — ASSESSMENT & PLAN NOTE
Hypothyroidism during pregnancy in third trimester  Reviewed with her the likelihood of higher dose of Synthroid to be needed during the pregnancy with maternal hypothyroidism. The importance of maintaining a euthyroid status for optimal pregnancy outcome was discussed to decrease the risk of adverse pregnancy outcome with uncontrolled hypothyroidism. Discussed that thyroxine (T4) is needed for fetal brain development. Also, high hcg levels in first trimester, can cause decreased TSH and need to adjust accordingly. Risks associated with uncontrolled hypothyroidism, include potential for pregnancy loss, neurologic/cognitive deficit in the baby,  delivery, LBW,  delivery, and gestational hypertension/preeclampsia     Currently, it is recommended to do trimester specific thyroid testing monitoring in pregnancy with TSH for adequate management and dosing of medication. Thyroid levels for  1st trimester: TSH - 0.1- 2.5  2nd trimester: TSH 0.2- 3.0  3rd trimester: TSH 0.3- 3.0  Free T4 levels may be unreliable in pregnancy, in addition to varying lab reference ranges. Although, abnormality 1.5 fold outside of reference range is suggestive of abnormality.     The prognosis should be quite good with adequately controlled hypothyroidism on medicine.     Advised to continue Synthroid 25mcg daily.      RECOMMEND TSH IF NOT DONE RECENTLY. She was instructed to report any symptoms of cold/heat intolerance, heart palpitations, fatigue or constipation

## 2023-06-14 NOTE — ASSESSMENT & PLAN NOTE
Pre-existing type 2 diabetes mellitus during pregnancy in third trimester  Continued excessive fetal growth (3436g at 85%, AC 99% on 23), Normal IVON, BPP .     Reviewed risks associated with diabetes in pregnancy including higher risk for polyhydramnios, fetal macrosomia and  metabolic complications (hypoglycemia, hyperbilirubinemia, hypocalcemia, erythema).    With risk of congenital heart defect, and poor views of heart she had fetal echo done, results as discussed in plan elsewhere.    Continue 2200 calorie ADA diet during pregnancy. It is recommended that she have 30 grams of carbohydrates with breakfast, and 60 grams with lunch and dinner. In addition, she should have three snacks in between meals with 15 grams of carbohydrates and at bedtime with 30 grams of carbohydrates.  Dietary advice reviewed again    Log reviewed. Advised to continue adjust to 38 NPH and 20 Humalog in am, 24 Hum with dinner and 50 NPH at bedtime. She was given corrective dose of 1 unit humalog for every 5 mg above 130.    Discussed risks of hyperglycemia, including risk of FDIU. With previous discrepancy of blood glucose values and reported values and noncompliance, advised to continue to follow strict diabetic diet at all times, check glucose 4/day and bring log to each visit.    Will continue low-dose aspirin daily until delivery

## 2023-06-19 ENCOUNTER — ANESTHESIA EVENT (OUTPATIENT)
Dept: OBSTETRICS AND GYNECOLOGY | Facility: HOSPITAL | Age: 28
End: 2023-06-19
Payer: MEDICAID

## 2023-06-20 ENCOUNTER — PROCEDURE VISIT (OUTPATIENT)
Dept: MATERNAL FETAL MEDICINE | Facility: CLINIC | Age: 28
End: 2023-06-20
Payer: MEDICAID

## 2023-06-20 ENCOUNTER — OFFICE VISIT (OUTPATIENT)
Dept: MATERNAL FETAL MEDICINE | Facility: CLINIC | Age: 28
End: 2023-06-20
Payer: MEDICAID

## 2023-06-20 VITALS
WEIGHT: 293 LBS | DIASTOLIC BLOOD PRESSURE: 77 MMHG | HEIGHT: 65 IN | SYSTOLIC BLOOD PRESSURE: 126 MMHG | BODY MASS INDEX: 48.82 KG/M2 | HEART RATE: 90 BPM

## 2023-06-20 DIAGNOSIS — O24.113 PRE-EXISTING TYPE 2 DIABETES MELLITUS DURING PREGNANCY IN THIRD TRIMESTER: ICD-10-CM

## 2023-06-20 DIAGNOSIS — O10.013 PRE-EXISTING ESSENTIAL HYPERTENSION COMPLICATING PREGNANCY IN THIRD TRIMESTER: ICD-10-CM

## 2023-06-20 DIAGNOSIS — E03.9 HYPOTHYROIDISM DURING PREGNANCY IN THIRD TRIMESTER: ICD-10-CM

## 2023-06-20 DIAGNOSIS — O99.213 OBESITY COMPLICATING PREGNANCY IN THIRD TRIMESTER: ICD-10-CM

## 2023-06-20 DIAGNOSIS — O35.BXX1: ICD-10-CM

## 2023-06-20 DIAGNOSIS — O99.283 HYPOTHYROIDISM DURING PREGNANCY IN THIRD TRIMESTER: ICD-10-CM

## 2023-06-20 PROCEDURE — 3008F PR BODY MASS INDEX (BMI) DOCUMENTED: ICD-10-PCS | Mod: CPTII,S$GLB,, | Performed by: OBSTETRICS & GYNECOLOGY

## 2023-06-20 PROCEDURE — 3074F SYST BP LT 130 MM HG: CPT | Mod: CPTII,S$GLB,, | Performed by: OBSTETRICS & GYNECOLOGY

## 2023-06-20 PROCEDURE — 1159F PR MEDICATION LIST DOCUMENTED IN MEDICAL RECORD: ICD-10-PCS | Mod: CPTII,S$GLB,, | Performed by: OBSTETRICS & GYNECOLOGY

## 2023-06-20 PROCEDURE — 99214 PR OFFICE/OUTPT VISIT, EST, LEVL IV, 30-39 MIN: ICD-10-PCS | Mod: TH,S$GLB,, | Performed by: OBSTETRICS & GYNECOLOGY

## 2023-06-20 PROCEDURE — 1160F RVW MEDS BY RX/DR IN RCRD: CPT | Mod: CPTII,S$GLB,, | Performed by: OBSTETRICS & GYNECOLOGY

## 2023-06-20 PROCEDURE — 1159F MED LIST DOCD IN RCRD: CPT | Mod: CPTII,S$GLB,, | Performed by: OBSTETRICS & GYNECOLOGY

## 2023-06-20 PROCEDURE — 1160F PR REVIEW ALL MEDS BY PRESCRIBER/CLIN PHARMACIST DOCUMENTED: ICD-10-PCS | Mod: CPTII,S$GLB,, | Performed by: OBSTETRICS & GYNECOLOGY

## 2023-06-20 PROCEDURE — 3078F DIAST BP <80 MM HG: CPT | Mod: CPTII,S$GLB,, | Performed by: OBSTETRICS & GYNECOLOGY

## 2023-06-20 PROCEDURE — 3008F BODY MASS INDEX DOCD: CPT | Mod: CPTII,S$GLB,, | Performed by: OBSTETRICS & GYNECOLOGY

## 2023-06-20 PROCEDURE — 76819 PR US, OB, FETAL BIOPHYSICAL, W/O NST: ICD-10-PCS | Mod: S$GLB,,, | Performed by: OBSTETRICS & GYNECOLOGY

## 2023-06-20 PROCEDURE — 3074F PR MOST RECENT SYSTOLIC BLOOD PRESSURE < 130 MM HG: ICD-10-PCS | Mod: CPTII,S$GLB,, | Performed by: OBSTETRICS & GYNECOLOGY

## 2023-06-20 PROCEDURE — 3078F PR MOST RECENT DIASTOLIC BLOOD PRESSURE < 80 MM HG: ICD-10-PCS | Mod: CPTII,S$GLB,, | Performed by: OBSTETRICS & GYNECOLOGY

## 2023-06-20 PROCEDURE — 99214 OFFICE O/P EST MOD 30 MIN: CPT | Mod: TH,S$GLB,, | Performed by: OBSTETRICS & GYNECOLOGY

## 2023-06-20 PROCEDURE — 76819 FETAL BIOPHYS PROFIL W/O NST: CPT | Mod: S$GLB,,, | Performed by: OBSTETRICS & GYNECOLOGY

## 2023-06-20 RX ORDER — LANCETS
EACH MISCELLANEOUS
COMMUNITY
Start: 2023-04-26

## 2023-06-20 NOTE — ASSESSMENT & PLAN NOTE
Chronic hypertension complicates up to 2-5% of pregnancies and is associated with significant adverse pregnancy outcomes including  birth, fetal growth restriction, fetal demise, placental abruption, and  delivery.     With blood pressure today BP: 126/77  , she was advised to continue labetalol 200mg bid. Continue low sodium diet avoiding any excessive weight gain.     Continue low dose asa as discussed in plan elsewhere. Reviewed preeclampsia precautions.     See fetal surveillance as discussed in plan elsewhere.     With chronic hypertension requiring higher dose of medication and type 2 diabetes mellitus with BMI over 55 recommend delivery no later than 37 weeks' gestation, at that would present the best balance between prematurity risks and risk of continued pregnancy.  Patient needs to deliver in [place] with the available NICU and pediatric Cardiology because of the concentric left ventricular hypertrophy noted on fetal echocardiogram

## 2023-06-20 NOTE — ASSESSMENT & PLAN NOTE
Pre-existing type 2 diabetes mellitus during pregnancy in third trimester  Continued excessive fetal growth (3436g at 85%, AC 99% on 23), Normal IVON, BPP .     Reviewed risks associated with diabetes in pregnancy including higher risk for polyhydramnios, fetal macrosomia and  metabolic complications (hypoglycemia, hyperbilirubinemia, hypocalcemia, erythema).    With risk of congenital heart defect, and poor views of heart she had fetal echo done, results as discussed in plan elsewhere.    Continue 2200 calorie ADA diet during pregnancy. It is recommended that she have 30 grams of carbohydrates with breakfast, and 60 grams with lunch and dinner. In addition, she should have three snacks in between meals with 15 grams of carbohydrates and at bedtime with 30 grams of carbohydrates.  Dietary advice reviewed again    Log reviewed. Advised to continue adjust to 38 NPH and 20 Humalog in am, 24 Hum with dinner and 58 NPH at bedtime. She was given corrective dose of 1 unit humalog for every 5 mg above 130.    Discussed risks of hyperglycemia, including risk of FDIU. With previous discrepancy of blood glucose values and reported values and noncompliance, advised to continue to follow strict diabetic diet at all times, check glucose 4/day and bring log to each visit.    Will continue low-dose aspirin daily until delivery

## 2023-06-20 NOTE — PROGRESS NOTES
"Maternal Fetal Medicine follow up consult    Subjective     Patient ID: Estephanie Johansen is a 28 y.o. female  at 36w6d gestation with Estimated Date of Delivery: 23  who is here for follow  up consultation by MFM.    Chief Complaint: MFM follow up with US (Type 2 Diabetes.  Patient is having irregular contractions.)      Pregnancy complications include:   Patient Active Problem List   Diagnosis    Pre-existing essential hypertension complicating pregnancy in third trimester    Pre-existing type 2 diabetes mellitus during pregnancy in third trimester    Significantly increased BMI complicating pregnancy in third trimester    Hypothyroidism during pregnancy in third trimester    Delivery with history of     Concentric left ventricular hypertrophy of fetus affecting antepartum care of mother, fetus 1        No changes to medical, surgical, family, social, or obstetric history.      HPI   She started diabetes mellitus on insulin with concentric left ventricular hypertrophy    Interval history since last MFM visit: None.. She denies any leaking fluid, vaginal bleeding, contractions, decreased fetal movement. Denies headaches, visual disturbances, or epigastric pain    Medications:  Current Outpatient Medications   Medication Instructions    aspirin 81 mg, Oral, Daily    BD INSULIN SYRINGE ULTRA-FINE 0.5 mL 31 gauge x 5/16" Syrg 1 Syringe, Subcutaneous, 4 times daily    ferrous sulfate (FEOSOL) 325 mg (65 mg iron) Tab tablet 1 tablet, Oral, Daily    insulin lispro 100 unit/mL injection Subcutaneous    insulin lispro protamine-insulin lispro (HUMALOG 75/25) 100 unit/mL (75-25) Susp Subcutaneous, Inject 24 units in AM and inject 24 units at supper    insulin NPH (HUMULIN N NPH U-100 INSULIN) 100 unit/mL injection INJECT 38 UNITS IN THE MORNING AND 44 UNITS BEFORE BEDTIME    insulin regular 100 unit/mL Inj injection Subcutaneous, inject 46 units in AM and inject 54 units at bedtime    insulin " "syringe-needle U-100 1 mL 29 gauge x 1/2" Syrg Misc.(Non-Drug; Combo Route)    labetaloL (NORMODYNE) 200 mg, Oral, Every 12 hours    lancets Misc USE 1 TO CHECK GLUCOSE TO SKIN 4 TIMES DAILY AS DIRECTED    LANCETS,THIN MISC Misc.(Non-Drug; Combo Route)    levothyroxine (SYNTHROID) 25 mcg, Oral, Before breakfast    TRUE METRIX GLUCOSE METER Misc USE TO CHECK GLUCOSE 4 TIMES DAILY AS DIRECTED 1 HOUR AFTER BREAKFAST, LUNCH AND DINNER    TRUE METRIX GLUCOSE TEST STRIP Strp 1 strip, 4 times daily       Review of Systems        Objective     Physical Exam  Vitals and nursing note reviewed.   Constitutional:       Appearance: Normal appearance.      Comments: Increased BMI   HENT:      Head: Normocephalic and atraumatic.      Nose: Nose normal. No congestion.   Eyes:      Conjunctiva/sclera: Conjunctivae normal.   Cardiovascular:      Rate and Rhythm: Normal rate.   Pulmonary:      Effort: Pulmonary effort is normal.   Skin:     Findings: No bruising or rash.   Neurological:      Mental Status: She is alert and oriented to person, place, and time.   Psychiatric:         Mood and Affect: Mood normal.         Behavior: Behavior normal.         Thought Content: Thought content normal.         Judgment: Judgment normal.          Assessment and Plan     ASSESSMENT/PLAN:     28 y.o.  female with IUP at 36w6d    Pre-existing type 2 diabetes mellitus during pregnancy in third trimester  Pre-existing type 2 diabetes mellitus during pregnancy in third trimester  Continued excessive fetal growth (3436g at 85%, AC 99% on 23), Normal IVON, BPP 8/8.     Reviewed risks associated with diabetes in pregnancy including higher risk for polyhydramnios, fetal macrosomia and  metabolic complications (hypoglycemia, hyperbilirubinemia, hypocalcemia, erythema).    With risk of congenital heart defect, and poor views of heart she had fetal echo done, results as discussed in plan elsewhere.    Continue 2200 calorie ADA diet during " pregnancy. It is recommended that she have 30 grams of carbohydrates with breakfast, and 60 grams with lunch and dinner. In addition, she should have three snacks in between meals with 15 grams of carbohydrates and at bedtime with 30 grams of carbohydrates.  Dietary advice reviewed again    Log reviewed. Advised to continue adjust to 38 NPH and 20 Humalog in am, 24 Hum with dinner and 58 NPH at bedtime. She was given corrective dose of 1 unit humalog for every 5 mg above 130.    Discussed risks of hyperglycemia, including risk of FDIU. With previous discrepancy of blood glucose values and reported values and noncompliance, advised to continue to follow strict diabetic diet at all times, check glucose 4/day and bring log to each visit.    Will continue low-dose aspirin daily until delivery    Hypothyroidism during pregnancy in third trimester  Hypothyroidism during pregnancy in third trimester  Reviewed with her the likelihood of higher dose of Synthroid to be needed during the pregnancy with maternal hypothyroidism. The importance of maintaining a euthyroid status for optimal pregnancy outcome was discussed to decrease the risk of adverse pregnancy outcome with uncontrolled hypothyroidism. Discussed that thyroxine (T4) is needed for fetal brain development. Also, high hcg levels in first trimester, can cause decreased TSH and need to adjust accordingly. Risks associated with uncontrolled hypothyroidism, include potential for pregnancy loss, neurologic/cognitive deficit in the baby,  delivery, LBW,  delivery, and gestational hypertension/preeclampsia     Currently, it is recommended to do trimester specific thyroid testing monitoring in pregnancy with TSH for adequate management and dosing of medication. Thyroid levels for  1st trimester: TSH - 0.1- 2.5  2nd trimester: TSH 0.2- 3.0  3rd trimester: TSH 0.3- 3.0  Free T4 levels may be unreliable in pregnancy, in addition to varying lab reference ranges.  Although, abnormality 1.5 fold outside of reference range is suggestive of abnormality.     The prognosis should be quite good with adequately controlled hypothyroidism on medicine.     Advised to continue Synthroid 25mcg daily.      RECOMMEND TSH IF NOT DONE RECENTLY. She was instructed to report any symptoms of cold/heat intolerance, heart palpitations, fatigue or constipation    Pre-existing essential hypertension complicating pregnancy in third trimester  Chronic hypertension complicates up to 2-5% of pregnancies and is associated with significant adverse pregnancy outcomes including  birth, fetal growth restriction, fetal demise, placental abruption, and  delivery.     With blood pressure today BP: 126/77  , she was advised to continue labetalol 200mg bid. Continue low sodium diet avoiding any excessive weight gain.     Continue low dose asa as discussed in plan elsewhere. Reviewed preeclampsia precautions.     See fetal surveillance as discussed in plan elsewhere.     With chronic hypertension requiring higher dose of medication and type 2 diabetes mellitus with BMI over 55 recommend delivery no later than 37 weeks' gestation, at that would present the best balance between prematurity risks and risk of continued pregnancy.  Patient needs to deliver in [place] with the available NICU and pediatric Cardiology because of the concentric left ventricular hypertrophy noted on fetal echocardiogram    Significantly increased BMI complicating pregnancy in third trimester  Elevated BMI over 50 at consult visit now at 55  Previous excessive gain, currently stable     Continue to follow low calorie, low fat, low Na, diabetic diet avoiding any additional excessive weight gain. Excess weight gain would be associated with worsening hypertension, worsening diabetes and adverse  outcomes, including fetal demise in utero.     The risks of  section in the massively obese patient (BMI greater  than 50) could be around 60%. The risk of complications in these  sections is substantial, including 30% risk of wound complications, mostly in the form of wound disruptions, with majority (85%) diagnosed after hospital discharge.     Preoperative antibiotics and thromboprophylaxis with use if pneumatic compression devices is recommended in those undergoing  delivery. Thromboprophylaxis should also be use with those on prolonged immobilization.    Concentric left ventricular hypertrophy of fetus affecting antepartum care of mother, fetus 1  Concentric left ventricular hypertrophy of fetus affecting antepartum care of mother, fetus 1  She had fetal echo done on 23 with mild concentric LVH without any outflow tract obstruction. Normal size of RV, VSD cannot be ruled out. Per note, recommendation for delivery in facility with ability to do  echocardiogram and involve cardiology if needed.     Patient is scheduled for  on Thursday this week    Follow up for No follow-up.             Components of this note were documented using voice recognition systems; and are subject to errors not corrected at proof reading.  Please contact the author for any clarifications.

## 2023-06-20 NOTE — ASSESSMENT & PLAN NOTE
Concentric left ventricular hypertrophy of fetus affecting antepartum care of mother, fetus 1  She had fetal echo done on 23 with mild concentric LVH without any outflow tract obstruction. Normal size of RV, VSD cannot be ruled out. Per note, recommendation for delivery in facility with ability to do  echocardiogram and involve cardiology if needed.

## 2023-06-21 NOTE — ANESTHESIA PREPROCEDURE EVALUATION
"                                                                                                             2023  Estephanie Johansen is a 28 y.o., female who presents with Early Term pregnancy, Hypertension, Diabetes Mellitus and Fetus w/ Cardiac anomaly.  Diagnosis:        37 weeks gestation of pregnancy [Z3A.37]       Anomaly of heart of fetus affecting pregnancy, antepartum, single or unspecified fetus [O35.BXX0]        She comes to [facility] L&D OR for the noted procedure under SAB.  Procedure:    SECTION (Abdomen)        PMHx:  Other Medical History   Diabetes mellitus Hypertension     Surgical History:   SECTION  SECTION           Vital signs:  Pre Vitals     Current as of 23 1159  BP: 126/77 Pulse: 90   Resp:  SpO2:    Temp:    Height: 5' 5" (1.651 m) (23) Weight: 150.1 kg (331 lb) (23)   BMI: 55.1 IBW: 57 kg (125 lb 10.6 oz)   Last edited 23 1447 by MARSHA          Lab Data:          Pre-op Assessment    I have reviewed the Patient Summary Reports.     I have reviewed the Nursing Notes. I have reviewed the NPO Status.   I have reviewed the Medications.     Review of Systems  Anesthesia Hx:  No problems with previous Anesthesia    Social:  Non-Smoker    Hematology/Oncology:  Hematology Normal   Oncology Normal     EENT/Dental:EENT/Dental Normal   Cardiovascular:   Exercise tolerance: good Hypertension  Functional Capacity good / => 4 METS    Pulmonary:  Pulmonary Normal    Renal/:  Renal/ Normal     Hepatic/GI:  Hepatic/GI Normal    Musculoskeletal:  Musculoskeletal Normal    Neurological:  Neurology Normal    Endocrine:   Diabetes Hypothyroidism  Morbid Obesity / BMI > 40  Dermatological:  Skin Normal    Psych:  Psychiatric Normal           Physical Exam  General: Well nourished, Cooperative, Alert and Oriented    Airway:  Mallampati: III   Mouth Opening: Normal  Tongue: Large  Neck ROM: Normal ROM    Dental:  Intact        Anesthesia Plan  Type of Anesthesia, " risks & benefits discussed:    Anesthesia Type: Spinal, Gen Natural Airway  Intra-op Monitoring Plan: Standard ASA Monitors  Post Op Pain Control Plan: IV/PO Opioids PRN and intrathecal opioid  Induction:  IV  Informed Consent: Informed consent signed with the Patient and all parties understand the risks and agree with anesthesia plan.  All questions answered. Patient consented to blood products? Yes  ASA Score: 3  Day of Surgery Review of History & Physical: H&P Update referred to the surgeon/provider.    Ready For Surgery From Anesthesia Perspective.     .

## 2023-06-21 NOTE — H&P
HISTORY AND PHYSICAL                                                OBSTETRICS          Subjective:      Estephanie Johansen is a 28 y.o.  female with IUP at 37w0d weeks gestation who presents to L&D for RLTCS. She denies contractions, vaginal bleeding, leakage of fluid.  Reports normal fetal movement.     PNC with Dr. Bass in [place]/Dr. Myles    Pregnancy c/b:  -CHTN: on Labetalol 200mg BID, ASA 81mg  -T2DM: Currently taking Humalin 20/58 and Lispro 24 with dinner and 24 at bedtime. Per Dr. Myles Humalog 20/24 and NPH 38/58  -Hypothyroidism: On synthroid 25mcg. Most recent TSH unknown  -Fetal Left ventricular hypertrophy: Fetal echo (2023) shows concentric LVH without obstructs. Normal RV. VSD cannot be ruled out.  -History of CD x2  -Polyhydramnios: IVON 27 on 2023  -Rubella non immune status: MMR postpartum   -Class III Obesity (BMI 55)    PMHx:   Past Medical History:   Diagnosis Date    Chronic Hypertension     Hypothyroidism, unspecified     Obesity, unspecified     Type 2 Diabetes mellitus        PSHx:   Past Surgical History:   Procedure Laterality Date     SECTION  2015     SECTION  2018       All: Review of patient's allergies indicates:  No Known Allergies    Meds:   Labetalol 200mg BID  Synthroid 25 mcg  Humalog 20/58  Lispro 24 at dinner and bedtime     SH:   Social History     Socioeconomic History    Marital status: Single   Tobacco Use    Smoking status: Every Day     Types: Vaping with nicotine     Passive exposure: Never    Smokeless tobacco: Never   Substance and Sexual Activity    Alcohol use: Not Currently    Drug use: Never    Sexual activity: Yes     Partners: Male       FH:   Family History   Problem Relation Age of Onset    Diabetes Mellitus Paternal Grandmother     Hypertension Maternal Grandmother     Heart disease Maternal Grandmother     Diabetes Mellitus Maternal Grandmother     Diabetes Mellitus Father     Hypertension Mother     Heart disease Mother      Diabetes Mellitus Mother        OBHx:   OB History    Para Term  AB Living   4 2 2 0 1 2   SAB IAB Ectopic Multiple Live Births   1 0 0 0 2      # Outcome Date GA Lbr Tobias/2nd Weight Sex Delivery Anes PTL Lv   4 Current            3 SAB     U SAB      2 Term 18 38w0d  3.629 kg (8 lb) F CS-Unspec Spinal N BERRY      Complications: HTN in pregnancy, chronic, GDM (gestational diabetes mellitus)   1 Term 08/17/15 38w0d  3.175 kg (7 lb) F CS-Unspec Spinal N BERRY      Complications: HTN in pregnancy, chronic, GDM (gestational diabetes mellitus)       Objective:      LMP 10/05/2022   There is no height or weight on file to calculate BMI.    General:   alert and cooperative   HEENT:  normocephalic, atraumatic   Lungs:   clear to auscultation bilaterally   Heart:   regular rate and rhythm, S1, S2 normal   Abdomen:  gravid, non-tender   Extremities non-tender, no edema       EFM: Cat 1, 140 bpm, modBTV, +accel, no decel (reassuring, reactive)  TOCO: intermittent CTX    Lab Review  Blood Type A+/-  Initial H/H 11.5/35.2--> 2TM 10.8/31.5  Hep B Neg  HIV NR  GC/CT: Neg x2  Rubella Non immune  Pap normal  GBBS: positive   A1c (1/10/2023) 6.7    Anatomy Scan (2/10/2023): Normal anatomy, normal IVON  Growth Scan (2023): Normal growth and IVON. EFW 1018 (53%ile)  Growth Scan (2023): Excessive fetal growth, normal IVON. EFW 2165 (79%ile)  BPP (2023): 8/8, IVON 27 vertex/anterior    Assessment:     28 y.o.  at 37w0d here for RLTCS     Plan:        1. Risks, benefits, alternatives and possible complications have been discussed in detail with the patient. All questions have been answered, and Ms. Johansen has voiced understanding and agrees to the treatment plan.  2. Consents signed and in chart  3. Admit to Labor and Delivery unit for repeat  delivery  4. CHTN: Continue home labetalol 200mg BID. Monitor BP's closely  5. T2DM: Will start at half her home dose postpartum  6. Hypothyroid:  Continue synthroid 25mcg  7. Rubella non immune: MMR postpartum    Pt seen and discussed with Dr. Farah.    Rob Rico MD  [employer] [facility]-PGY2  06/22/2023 8:18 AM    Weight based insulin dosing  Will do postpartum half dose calculation for  AM: NPH 30/reg 15  PM: NPH 12/reg 12    Assuming care of patient for repeat C/S    Viridiana Avina MD  OB/GYN Hospitalist  6/22/2023  1:54 PM

## 2023-06-22 ENCOUNTER — ANESTHESIA (OUTPATIENT)
Dept: OBSTETRICS AND GYNECOLOGY | Facility: HOSPITAL | Age: 28
End: 2023-06-22
Payer: MEDICAID

## 2023-06-22 ENCOUNTER — HOSPITAL ENCOUNTER (INPATIENT)
Facility: HOSPITAL | Age: 28
LOS: 2 days | Discharge: HOME | End: 2023-06-24
Attending: OBSTETRICS & GYNECOLOGY | Admitting: OBSTETRICS & GYNECOLOGY
Payer: MEDICAID

## 2023-06-22 DIAGNOSIS — O34.219 DELIVERY WITH HISTORY OF C-SECTION: Primary | ICD-10-CM

## 2023-06-22 DIAGNOSIS — Z98.891 HISTORY OF CESAREAN DELIVERY: ICD-10-CM

## 2023-06-22 DIAGNOSIS — O16.3 HYPERTENSION COMPLICATING PREGNANCY, THIRD TRIMESTER: ICD-10-CM

## 2023-06-22 PROBLEM — N73.6: Status: ACTIVE | Noted: 2023-06-22

## 2023-06-22 PROBLEM — O34.83: Status: ACTIVE | Noted: 2023-06-22

## 2023-06-22 LAB
ABORH RETYPE: NORMAL
BASE EXCESS BLD CALC-SCNC: 0 MMOL/L
BASOPHILS # BLD AUTO: 0.03 X10(3)/MCL
BASOPHILS NFR BLD AUTO: 0.5 %
BLOOD GAS SAMPLE TYPE (OHS): ABNORMAL
CA-I BLD-SCNC: 1.41 MMOL/L (ref 1.12–1.32)
CO2 BLDA-SCNC: 30.2 MMOL/L
DRAWN BY BLOOD GAS (OHS): ABNORMAL
EOSINOPHIL # BLD AUTO: 0.15 X10(3)/MCL (ref 0–0.9)
EOSINOPHIL NFR BLD AUTO: 2.4 %
ERYTHROCYTE [DISTWIDTH] IN BLOOD BY AUTOMATED COUNT: 19.4 % (ref 11.5–17)
GLUCOSE: 92
GROUP & RH: NORMAL
HCO3 BLDA-SCNC: 28.3 MMOL/L
HCT VFR BLD AUTO: 38.9 % (ref 37–47)
HGB BLD-MCNC: 11.7 G/DL (ref 12–16)
IMM GRANULOCYTES # BLD AUTO: 0.04 X10(3)/MCL (ref 0–0.04)
IMM GRANULOCYTES NFR BLD AUTO: 0.7 %
INDIRECT COOMBS GEL: NORMAL
LYMPHOCYTES # BLD AUTO: 1.38 X10(3)/MCL (ref 0.6–4.6)
LYMPHOCYTES NFR BLD AUTO: 22.5 %
MCH RBC QN AUTO: 22.3 PG (ref 27–31)
MCHC RBC AUTO-ENTMCNC: 30.1 G/DL (ref 33–36)
MCV RBC AUTO: 74.2 FL (ref 80–94)
MONOCYTES # BLD AUTO: 0.51 X10(3)/MCL (ref 0.1–1.3)
MONOCYTES NFR BLD AUTO: 8.3 %
NEUTROPHILS # BLD AUTO: 4.02 X10(3)/MCL (ref 2.1–9.2)
NEUTROPHILS NFR BLD AUTO: 65.6 %
NRBC BLD AUTO-RTO: 0 %
PCO2 BLDA: 63 MMHG
PH BLDA: 7.26 [PH]
PLATELET # BLD AUTO: 257 X10(3)/MCL (ref 130–400)
PMV BLD AUTO: 10.6 FL (ref 7.4–10.4)
PO2 BLDA: 12 MMHG
POCT GLUCOSE: 100 MG/DL (ref 70–110)
POCT GLUCOSE: 123 MG/DL (ref 70–110)
POCT GLUCOSE: 57 MG/DL (ref 70–110)
POCT GLUCOSE: 92 MG/DL (ref 70–110)
POTASSIUM BLOOD GAS (OHS): 5.5 MMOL/L
RBC # BLD AUTO: 5.24 X10(6)/MCL (ref 4.2–5.4)
SAO2 % BLDA: 10 %
SODIUM BLOOD GAS (OHS): 133 MMOL/L
SPECIMEN OUTDATE: NORMAL
T PALLIDUM AB SER QL: NONREACTIVE
WBC # SPEC AUTO: 6.13 X10(3)/MCL (ref 4.5–11.5)

## 2023-06-22 PROCEDURE — 88307 TISSUE EXAM BY PATHOLOGIST: CPT | Performed by: OBSTETRICS & GYNECOLOGY

## 2023-06-22 PROCEDURE — 59514 PRA REAN DELIVERY ONLY: ICD-10-PCS | Mod: QY,ANES,, | Performed by: ANESTHESIOLOGY

## 2023-06-22 PROCEDURE — 99900035 HC TECH TIME PER 15 MIN (STAT)

## 2023-06-22 PROCEDURE — 86923 COMPATIBILITY TEST ELECTRIC: CPT | Mod: 91 | Performed by: OBSTETRICS & GYNECOLOGY

## 2023-06-22 PROCEDURE — 25000003 PHARM REV CODE 250: Performed by: ANESTHESIOLOGY

## 2023-06-22 PROCEDURE — 36004725 HC OB OR TIME LEV III - EA ADD 15 MIN: Performed by: OBSTETRICS & GYNECOLOGY

## 2023-06-22 PROCEDURE — 36004724 HC OB OR TIME LEV III - 1ST 15 MIN: Performed by: OBSTETRICS & GYNECOLOGY

## 2023-06-22 PROCEDURE — 25000003 PHARM REV CODE 250: Performed by: NURSE ANESTHETIST, CERTIFIED REGISTERED

## 2023-06-22 PROCEDURE — 36416 COLLJ CAPILLARY BLOOD SPEC: CPT

## 2023-06-22 PROCEDURE — 62322 NJX INTERLAMINAR LMBR/SAC: CPT | Performed by: ANESTHESIOLOGY

## 2023-06-22 PROCEDURE — 51702 INSERT TEMP BLADDER CATH: CPT

## 2023-06-22 PROCEDURE — 63600175 PHARM REV CODE 636 W HCPCS

## 2023-06-22 PROCEDURE — 27200033

## 2023-06-22 PROCEDURE — 25000003 PHARM REV CODE 250: Performed by: OBSTETRICS & GYNECOLOGY

## 2023-06-22 PROCEDURE — 25000003 PHARM REV CODE 250

## 2023-06-22 PROCEDURE — 27000492 HC SLEEVE, SCD T/L

## 2023-06-22 PROCEDURE — 63600175 PHARM REV CODE 636 W HCPCS: Performed by: NURSE ANESTHETIST, CERTIFIED REGISTERED

## 2023-06-22 PROCEDURE — 37000009 HC ANESTHESIA EA ADD 15 MINS: Performed by: OBSTETRICS & GYNECOLOGY

## 2023-06-22 PROCEDURE — 37000008 HC ANESTHESIA 1ST 15 MINUTES: Performed by: OBSTETRICS & GYNECOLOGY

## 2023-06-22 PROCEDURE — 71000033 HC RECOVERY, INTIAL HOUR: Performed by: OBSTETRICS & GYNECOLOGY

## 2023-06-22 PROCEDURE — 63600175 PHARM REV CODE 636 W HCPCS: Performed by: OBSTETRICS & GYNECOLOGY

## 2023-06-22 PROCEDURE — 59514 CESAREAN DELIVERY ONLY: CPT | Mod: QX,CRNA,, | Performed by: NURSE ANESTHETIST, CERTIFIED REGISTERED

## 2023-06-22 PROCEDURE — 59514 CESAREAN DELIVERY ONLY: CPT | Mod: QY,ANES,, | Performed by: ANESTHESIOLOGY

## 2023-06-22 PROCEDURE — 59514 PRA REAN DELIVERY ONLY: ICD-10-PCS | Mod: QX,CRNA,, | Performed by: NURSE ANESTHETIST, CERTIFIED REGISTERED

## 2023-06-22 PROCEDURE — 82803 BLOOD GASES ANY COMBINATION: CPT

## 2023-06-22 PROCEDURE — 86780 TREPONEMA PALLIDUM: CPT

## 2023-06-22 PROCEDURE — 86900 BLOOD TYPING SEROLOGIC ABO: CPT

## 2023-06-22 PROCEDURE — 85025 COMPLETE CBC W/AUTO DIFF WBC: CPT

## 2023-06-22 PROCEDURE — 11000001 HC ACUTE MED/SURG PRIVATE ROOM

## 2023-06-22 RX ORDER — PRENATAL WITH FERROUS FUM AND FOLIC ACID 3080; 920; 120; 400; 22; 1.84; 3; 20; 10; 1; 12; 200; 27; 25; 2 [IU]/1; [IU]/1; MG/1; [IU]/1; MG/1; MG/1; MG/1; MG/1; MG/1; MG/1; UG/1; MG/1; MG/1; MG/1; MG/1
1 TABLET ORAL DAILY
Status: DISCONTINUED | OUTPATIENT
Start: 2023-06-23 | End: 2023-06-24 | Stop reason: HOSPADM

## 2023-06-22 RX ORDER — MORPHINE SULFATE 1 MG/ML
INJECTION, SOLUTION EPIDURAL; INTRATHECAL; INTRAVENOUS
Status: DISCONTINUED | OUTPATIENT
Start: 2023-06-22 | End: 2023-06-22

## 2023-06-22 RX ORDER — DOCUSATE SODIUM 100 MG/1
200 CAPSULE, LIQUID FILLED ORAL 2 TIMES DAILY
Status: DISCONTINUED | OUTPATIENT
Start: 2023-06-22 | End: 2023-06-24 | Stop reason: HOSPADM

## 2023-06-22 RX ORDER — DEXTROSE MONOHYDRATE 100 MG/ML
12.5 INJECTION, SOLUTION INTRAVENOUS
Status: DISCONTINUED | OUTPATIENT
Start: 2023-06-22 | End: 2023-06-24 | Stop reason: HOSPADM

## 2023-06-22 RX ORDER — TRIPROLIDINE/PSEUDOEPHEDRINE 2.5MG-60MG
600 TABLET ORAL EVERY 6 HOURS PRN
Status: DISCONTINUED | OUTPATIENT
Start: 2023-06-22 | End: 2023-06-24 | Stop reason: HOSPADM

## 2023-06-22 RX ORDER — SODIUM CITRATE AND CITRIC ACID MONOHYDRATE 334; 500 MG/5ML; MG/5ML
30 SOLUTION ORAL
Status: DISCONTINUED | OUTPATIENT
Start: 2023-06-22 | End: 2023-06-22

## 2023-06-22 RX ORDER — PROCHLORPERAZINE EDISYLATE 5 MG/ML
5 INJECTION INTRAMUSCULAR; INTRAVENOUS EVERY 6 HOURS PRN
Status: DISCONTINUED | OUTPATIENT
Start: 2023-06-22 | End: 2023-06-22

## 2023-06-22 RX ORDER — PROCHLORPERAZINE EDISYLATE 5 MG/ML
5 INJECTION INTRAMUSCULAR; INTRAVENOUS EVERY 6 HOURS PRN
Status: DISCONTINUED | OUTPATIENT
Start: 2023-06-22 | End: 2023-06-24 | Stop reason: HOSPADM

## 2023-06-22 RX ORDER — OXYCODONE AND ACETAMINOPHEN 5; 325 MG/1; MG/1
1 TABLET ORAL EVERY 4 HOURS PRN
Status: DISCONTINUED | OUTPATIENT
Start: 2023-06-22 | End: 2023-06-24 | Stop reason: HOSPADM

## 2023-06-22 RX ORDER — NALOXONE HCL 0.4 MG/ML
0.04 VIAL (ML) INJECTION
Status: DISCONTINUED | OUTPATIENT
Start: 2023-06-22 | End: 2023-06-24 | Stop reason: HOSPADM

## 2023-06-22 RX ORDER — ADHESIVE BANDAGE
30 BANDAGE TOPICAL 2 TIMES DAILY PRN
Status: DISCONTINUED | OUTPATIENT
Start: 2023-06-23 | End: 2023-06-24 | Stop reason: HOSPADM

## 2023-06-22 RX ORDER — SIMETHICONE 80 MG
1 TABLET,CHEWABLE ORAL EVERY 6 HOURS PRN
Status: DISCONTINUED | OUTPATIENT
Start: 2023-06-22 | End: 2023-06-24 | Stop reason: HOSPADM

## 2023-06-22 RX ORDER — ACETAMINOPHEN 10 MG/ML
INJECTION, SOLUTION INTRAVENOUS
Status: DISCONTINUED | OUTPATIENT
Start: 2023-06-22 | End: 2023-06-22

## 2023-06-22 RX ORDER — FAMOTIDINE 10 MG/ML
20 INJECTION INTRAVENOUS
Status: DISCONTINUED | OUTPATIENT
Start: 2023-06-22 | End: 2023-06-22

## 2023-06-22 RX ORDER — EPHEDRINE SULFATE 50 MG/ML
INJECTION, SOLUTION INTRAVENOUS
Status: DISCONTINUED | OUTPATIENT
Start: 2023-06-22 | End: 2023-06-22

## 2023-06-22 RX ORDER — OXYTOCIN/RINGER'S LACTATE 30/500 ML
334 PLASTIC BAG, INJECTION (ML) INTRAVENOUS ONCE AS NEEDED
Status: DISCONTINUED | OUTPATIENT
Start: 2023-06-22 | End: 2023-06-24 | Stop reason: HOSPADM

## 2023-06-22 RX ORDER — ONDANSETRON 2 MG/ML
4 INJECTION INTRAMUSCULAR; INTRAVENOUS EVERY 12 HOURS PRN
Status: DISCONTINUED | OUTPATIENT
Start: 2023-06-22 | End: 2023-06-24 | Stop reason: HOSPADM

## 2023-06-22 RX ORDER — LABETALOL 200 MG/1
200 TABLET, FILM COATED ORAL 3 TIMES DAILY
Status: DISCONTINUED | OUTPATIENT
Start: 2023-06-22 | End: 2023-06-24 | Stop reason: HOSPADM

## 2023-06-22 RX ORDER — LABETALOL 200 MG/1
200 TABLET, FILM COATED ORAL EVERY 12 HOURS
Status: DISCONTINUED | OUTPATIENT
Start: 2023-06-22 | End: 2023-06-22

## 2023-06-22 RX ORDER — GLUCAGON 1 MG
1 KIT INJECTION
Status: DISCONTINUED | OUTPATIENT
Start: 2023-06-22 | End: 2023-06-24 | Stop reason: HOSPADM

## 2023-06-22 RX ORDER — OXYTOCIN/RINGER'S LACTATE 30/500 ML
95 PLASTIC BAG, INJECTION (ML) INTRAVENOUS ONCE
Status: COMPLETED | OUTPATIENT
Start: 2023-06-22 | End: 2023-06-22

## 2023-06-22 RX ORDER — OXYTOCIN 10 [USP'U]/ML
10 INJECTION, SOLUTION INTRAMUSCULAR; INTRAVENOUS ONCE AS NEEDED
Status: DISCONTINUED | OUTPATIENT
Start: 2023-06-22 | End: 2023-06-24 | Stop reason: HOSPADM

## 2023-06-22 RX ORDER — TRANEXAMIC ACID 100 MG/ML
INJECTION, SOLUTION INTRAVENOUS
Status: DISCONTINUED | OUTPATIENT
Start: 2023-06-22 | End: 2023-06-22

## 2023-06-22 RX ORDER — MISOPROSTOL 100 UG/1
800 TABLET ORAL
Status: DISCONTINUED | OUTPATIENT
Start: 2023-06-22 | End: 2023-06-22

## 2023-06-22 RX ORDER — OXYTOCIN/RINGER'S LACTATE 30/500 ML
95 PLASTIC BAG, INJECTION (ML) INTRAVENOUS ONCE AS NEEDED
Status: DISCONTINUED | OUTPATIENT
Start: 2023-06-22 | End: 2023-06-24 | Stop reason: HOSPADM

## 2023-06-22 RX ORDER — DEXTROSE, SODIUM CHLORIDE, SODIUM LACTATE, POTASSIUM CHLORIDE, AND CALCIUM CHLORIDE 5; .6; .31; .03; .02 G/100ML; G/100ML; G/100ML; G/100ML; G/100ML
INJECTION, SOLUTION INTRAVENOUS CONTINUOUS PRN
Status: DISCONTINUED | OUTPATIENT
Start: 2023-06-22 | End: 2023-06-22

## 2023-06-22 RX ORDER — MISOPROSTOL 100 UG/1
800 TABLET ORAL ONCE AS NEEDED
Status: DISCONTINUED | OUTPATIENT
Start: 2023-06-22 | End: 2023-06-24 | Stop reason: HOSPADM

## 2023-06-22 RX ORDER — EPHEDRINE SULFATE 50 MG/ML
10 INJECTION, SOLUTION INTRAVENOUS
Status: DISCONTINUED | OUTPATIENT
Start: 2023-06-22 | End: 2023-06-22

## 2023-06-22 RX ORDER — AMOXICILLIN 250 MG
1 CAPSULE ORAL NIGHTLY PRN
Status: DISCONTINUED | OUTPATIENT
Start: 2023-06-22 | End: 2023-06-24 | Stop reason: HOSPADM

## 2023-06-22 RX ORDER — HYDROCODONE BITARTRATE AND ACETAMINOPHEN 500; 5 MG/1; MG/1
TABLET ORAL
Status: DISCONTINUED | OUTPATIENT
Start: 2023-06-22 | End: 2023-06-22

## 2023-06-22 RX ORDER — CARBOPROST TROMETHAMINE 250 UG/ML
250 INJECTION, SOLUTION INTRAMUSCULAR
Status: DISCONTINUED | OUTPATIENT
Start: 2023-06-22 | End: 2023-06-24 | Stop reason: HOSPADM

## 2023-06-22 RX ORDER — MUPIROCIN 20 MG/G
OINTMENT TOPICAL
Status: DISCONTINUED | OUTPATIENT
Start: 2023-06-22 | End: 2023-06-22

## 2023-06-22 RX ORDER — CARBOPROST TROMETHAMINE 250 UG/ML
250 INJECTION, SOLUTION INTRAMUSCULAR
Status: DISCONTINUED | OUTPATIENT
Start: 2023-06-22 | End: 2023-06-22

## 2023-06-22 RX ORDER — METRONIDAZOLE 500 MG/1
500 TABLET ORAL EVERY 8 HOURS
Status: DISPENSED | OUTPATIENT
Start: 2023-06-22 | End: 2023-06-24

## 2023-06-22 RX ORDER — OXYTOCIN/RINGER'S LACTATE 30/500 ML
334 PLASTIC BAG, INJECTION (ML) INTRAVENOUS ONCE
Status: COMPLETED | OUTPATIENT
Start: 2023-06-22 | End: 2023-06-22

## 2023-06-22 RX ORDER — BUPIVACAINE HYDROCHLORIDE 7.5 MG/ML
INJECTION, SOLUTION EPIDURAL; RETROBULBAR
Status: COMPLETED | OUTPATIENT
Start: 2023-06-22 | End: 2023-06-22

## 2023-06-22 RX ORDER — ONDANSETRON 2 MG/ML
4 INJECTION INTRAMUSCULAR; INTRAVENOUS EVERY 12 HOURS PRN
Status: DISCONTINUED | OUTPATIENT
Start: 2023-06-22 | End: 2023-06-22

## 2023-06-22 RX ORDER — SODIUM CHLORIDE, SODIUM LACTATE, POTASSIUM CHLORIDE, CALCIUM CHLORIDE 600; 310; 30; 20 MG/100ML; MG/100ML; MG/100ML; MG/100ML
INJECTION, SOLUTION INTRAVENOUS CONTINUOUS
Status: DISCONTINUED | OUTPATIENT
Start: 2023-06-22 | End: 2023-06-22

## 2023-06-22 RX ORDER — HYDROMORPHONE HYDROCHLORIDE 2 MG/ML
1 INJECTION, SOLUTION INTRAMUSCULAR; INTRAVENOUS; SUBCUTANEOUS EVERY 4 HOURS PRN
Status: DISCONTINUED | OUTPATIENT
Start: 2023-06-22 | End: 2023-06-24 | Stop reason: HOSPADM

## 2023-06-22 RX ORDER — LEVOTHYROXINE SODIUM 25 UG/1
25 TABLET ORAL
Status: DISCONTINUED | OUTPATIENT
Start: 2023-06-22 | End: 2023-06-24 | Stop reason: HOSPADM

## 2023-06-22 RX ORDER — IBUPROFEN 200 MG
16 TABLET ORAL
Status: DISCONTINUED | OUTPATIENT
Start: 2023-06-22 | End: 2023-06-24 | Stop reason: HOSPADM

## 2023-06-22 RX ORDER — SODIUM CHLORIDE 0.9 % (FLUSH) 0.9 %
10 SYRINGE (ML) INJECTION
Status: DISCONTINUED | OUTPATIENT
Start: 2023-06-22 | End: 2023-06-24 | Stop reason: HOSPADM

## 2023-06-22 RX ORDER — BISACODYL 10 MG
10 SUPPOSITORY, RECTAL RECTAL ONCE AS NEEDED
Status: DISCONTINUED | OUTPATIENT
Start: 2023-06-22 | End: 2023-06-24 | Stop reason: HOSPADM

## 2023-06-22 RX ORDER — IBUPROFEN 200 MG
24 TABLET ORAL
Status: DISCONTINUED | OUTPATIENT
Start: 2023-06-22 | End: 2023-06-24 | Stop reason: HOSPADM

## 2023-06-22 RX ORDER — FENTANYL CITRATE 50 UG/ML
INJECTION, SOLUTION INTRAMUSCULAR; INTRAVENOUS
Status: DISCONTINUED | OUTPATIENT
Start: 2023-06-22 | End: 2023-06-22

## 2023-06-22 RX ORDER — INSULIN ASPART 100 [IU]/ML
0-5 INJECTION, SOLUTION INTRAVENOUS; SUBCUTANEOUS
Status: DISCONTINUED | OUTPATIENT
Start: 2023-06-22 | End: 2023-06-24 | Stop reason: HOSPADM

## 2023-06-22 RX ORDER — METHYLERGONOVINE MALEATE 0.2 MG/ML
200 INJECTION INTRAVENOUS
Status: DISCONTINUED | OUTPATIENT
Start: 2023-06-22 | End: 2023-06-24 | Stop reason: HOSPADM

## 2023-06-22 RX ORDER — OXYCODONE AND ACETAMINOPHEN 10; 325 MG/1; MG/1
1 TABLET ORAL EVERY 4 HOURS PRN
Status: DISCONTINUED | OUTPATIENT
Start: 2023-06-22 | End: 2023-06-24 | Stop reason: HOSPADM

## 2023-06-22 RX ORDER — SODIUM CITRATE AND CITRIC ACID MONOHYDRATE 334; 500 MG/5ML; MG/5ML
30 SOLUTION ORAL ONCE
Status: DISCONTINUED | OUTPATIENT
Start: 2023-06-22 | End: 2023-06-22

## 2023-06-22 RX ORDER — METHYLERGONOVINE MALEATE 0.2 MG/ML
200 INJECTION INTRAVENOUS
Status: DISCONTINUED | OUTPATIENT
Start: 2023-06-22 | End: 2023-06-22

## 2023-06-22 RX ORDER — ONDANSETRON 4 MG/1
8 TABLET, ORALLY DISINTEGRATING ORAL EVERY 8 HOURS PRN
Status: DISCONTINUED | OUTPATIENT
Start: 2023-06-22 | End: 2023-06-24 | Stop reason: HOSPADM

## 2023-06-22 RX ORDER — MUPIROCIN 20 MG/G
OINTMENT TOPICAL 2 TIMES DAILY
Status: DISCONTINUED | OUTPATIENT
Start: 2023-06-22 | End: 2023-06-24 | Stop reason: HOSPADM

## 2023-06-22 RX ORDER — DIPHENHYDRAMINE HCL 25 MG
25 CAPSULE ORAL EVERY 4 HOURS PRN
Status: DISCONTINUED | OUTPATIENT
Start: 2023-06-22 | End: 2023-06-24 | Stop reason: HOSPADM

## 2023-06-22 RX ORDER — OXYTOCIN/RINGER'S LACTATE 30/500 ML
95 PLASTIC BAG, INJECTION (ML) INTRAVENOUS ONCE
Status: DISCONTINUED | OUTPATIENT
Start: 2023-06-22 | End: 2023-06-24 | Stop reason: HOSPADM

## 2023-06-22 RX ORDER — IBUPROFEN 600 MG/1
600 TABLET ORAL EVERY 6 HOURS PRN
Status: DISCONTINUED | OUTPATIENT
Start: 2023-06-22 | End: 2023-06-24 | Stop reason: HOSPADM

## 2023-06-22 RX ORDER — LABETALOL 100 MG/1
100 TABLET, FILM COATED ORAL 2 TIMES DAILY
Status: DISCONTINUED | OUTPATIENT
Start: 2023-06-22 | End: 2023-06-22 | Stop reason: SDUPTHER

## 2023-06-22 RX ORDER — CEPHALEXIN 500 MG/1
500 CAPSULE ORAL EVERY 8 HOURS
Status: COMPLETED | OUTPATIENT
Start: 2023-06-22 | End: 2023-06-24

## 2023-06-22 RX ORDER — ONDANSETRON 2 MG/ML
INJECTION INTRAMUSCULAR; INTRAVENOUS
Status: DISCONTINUED | OUTPATIENT
Start: 2023-06-22 | End: 2023-06-22

## 2023-06-22 RX ADMIN — BUPIVACAINE HYDROCHLORIDE 1.7 ML: 7.5 INJECTION, SOLUTION EPIDURAL; RETROBULBAR at 02:06

## 2023-06-22 RX ADMIN — ONDANSETRON 4 MG: 2 INJECTION INTRAMUSCULAR; INTRAVENOUS at 02:06

## 2023-06-22 RX ADMIN — METRONIDAZOLE 500 MG: 500 TABLET ORAL at 08:06

## 2023-06-22 RX ADMIN — CEPHALEXIN 500 MG: 500 CAPSULE ORAL at 08:06

## 2023-06-22 RX ADMIN — DOCUSATE SODIUM 200 MG: 100 CAPSULE, LIQUID FILLED ORAL at 08:06

## 2023-06-22 RX ADMIN — IBUPROFEN 600 MG: 600 TABLET, FILM COATED ORAL at 10:06

## 2023-06-22 RX ADMIN — TRANEXAMIC ACID 1000 MG: 100 INJECTION, SOLUTION INTRAVENOUS at 03:06

## 2023-06-22 RX ADMIN — DEXTROSE MONOHYDRATE 3 G: 50 INJECTION, SOLUTION INTRAVENOUS at 02:06

## 2023-06-22 RX ADMIN — MORPHINE SULFATE 0.15 MG: 1 INJECTION, SOLUTION EPIDURAL; INTRATHECAL; INTRAVENOUS at 02:06

## 2023-06-22 RX ADMIN — Medication 1000 ML/HR: at 02:06

## 2023-06-22 RX ADMIN — OXYCODONE AND ACETAMINOPHEN 1 TABLET: 10; 325 TABLET ORAL at 09:06

## 2023-06-22 RX ADMIN — HYDROMORPHONE HYDROCHLORIDE 1 MG: 2 INJECTION INTRAMUSCULAR; INTRAVENOUS; SUBCUTANEOUS at 05:06

## 2023-06-22 RX ADMIN — ACETAMINOPHEN 1000 MG: 10 INJECTION, SOLUTION INTRAVENOUS at 02:06

## 2023-06-22 RX ADMIN — FENTANYL CITRATE 10 MCG: 50 INJECTION, SOLUTION INTRAMUSCULAR; INTRAVENOUS at 02:06

## 2023-06-22 RX ADMIN — SODIUM CHLORIDE, POTASSIUM CHLORIDE, SODIUM LACTATE AND CALCIUM CHLORIDE: 600; 310; 30; 20 INJECTION, SOLUTION INTRAVENOUS at 08:06

## 2023-06-22 RX ADMIN — SODIUM CHLORIDE, SODIUM LACTATE, POTASSIUM CHLORIDE, CALCIUM CHLORIDE AND DEXTROSE MONOHYDRATE: 5; 600; 310; 30; 20 INJECTION, SOLUTION INTRAVENOUS at 02:06

## 2023-06-22 RX ADMIN — INSULIN HUMAN 12 UNITS: 100 INJECTION, SUSPENSION SUBCUTANEOUS at 09:06

## 2023-06-22 RX ADMIN — EPHEDRINE SULFATE 50 MG: 50 INJECTION INTRAVENOUS at 02:06

## 2023-06-22 RX ADMIN — Medication 95 MILLI-UNITS/MIN: at 04:06

## 2023-06-22 NOTE — L&D DELIVERY NOTE
"[facility] - Labor and Delivery   Section   Operative Note    SUMMARY     Date of Procedure: 2023     Procedure: Procedure(s) (LRB):   SECTION (N/A) repeat    Surgeon(s) and Role:     * Viridiana Avina MD    Assisting Surgeon: Fina Machado MD  See op note for details-- significant scarring noted of uterine anterior wall to fascia and muscle    Delivery Information for Winston Johansen    Birth information:  YOB: 2022   Time of birth: 2:45 PM   Sex: female   Head Delivery Date/Time: 2023  2:44 PM   Delivery type: , Low Transverse   Gestational Age: 37w1d        Delivery Providers    Delivering clinician:            Measurements    Weight: 3685 g  Weight (lbs): 8 lb 2 oz  Length: 53.3 cm  Length (in): 21"  Head circumference: 31 cm         Apgars    Living status: Living  Apgars:  1 min.:  5 min.:  10 min.:  15 min.:  20 min.:    Skin color:  0  1       Heart rate:  2  2       Reflex irritability:  1  2       Muscle tone:  2  2       Respiratory effort:  2  2       Total:  7  9       Apgars assigned by: JUDAH ANDERSON RN         Operative Delivery    Forceps attempted?: No  Vacuum extractor attempted?: Yes  Vacuum type: MityVac  Vacuum application location: Mid  First attempt time vacuum applied: 2023 14:43:50  First attempt time vacuum removed: 2023 14:43:57  Second attempt time vacuum applied: 2023 14:44:30  Second attempt time vacuum removed: 2023 14:44:40  Third attempt time vacuum applied: 2023 14:45:00  Third attempt time vacuum removed: 2023 14:45:10  Number of pop offs: 2  Number of pulls with vacuum: 3  Low end pressure range (mmHg): 15  High end pressure range (mmHg): 20  Vacuum applied by: EDWAR PAGAN  Failed vacuum delivery?: No               Presentation    Presentation: Vertex  Position: Middle Occiput Anterior           Interventions/Resuscitation    Method: Bulb Suctioning, Tactile Stimulation, Deep " Suctioning, PPV       Cord    Vessels: 3 vessels  Complications: None  Delayed Cord Clamping?: No  Cord Blood Disposition: Sent with Baby  Gases Sent?: No  Stem Cell Collection (by MD): No       Placenta    Placenta delivery date/time:   Placenta removal:            Labor Events:       labor:       Labor Onset Date/Time:         Dilation Complete Date/Time:         Start Pushing Date/Time:       Rupture Date/Time:            Rupture type:          Fluid Amount:       Fluid Color:        steroids:       Antibiotics given for GBS:       Induction:       Indications for induction:        Augmentation:       Indications for augmentation:       Labor complications:       Additional complications:          Cervical ripening:                     Delivery:      Episiotomy:       Indication for Episiotomy:       Perineal Lacerations:   Repaired:      Periurethral Laceration:   Repaired:     Labial Laceration:   Repaired:     Sulcus Laceration:   Repaired:     Vaginal Laceration:   Repaired:     Cervical Laceration:   Repaired:     Repair suture:       Repair # of packets:       Last Value - EBL - Nursing (mL):       Sum - EBL - Nursing (mL): 0     Last Value - EBL - Anesthesia (mL):      Calculated QBL (mL):       Vaginal Sweep Performed:       Surgicount Correct:         Other providers:       Anesthesia    Method: Spinal          Details (if applicable):  Trial of Labor No    Categorization: Repeat    Priority: Routine   Indications for : Repeat Section   Incision Type: low transverse     Additional  information:  Forceps:    Vacuum:    Breech:    Observed anomalies    Other (Comments):

## 2023-06-22 NOTE — TRANSFER OF CARE
"Anesthesia Transfer of Care Note    Patient: Estephanie Johansen    Procedure(s) Performed: Procedure(s) (LRB):   SECTION (N/A)    Patient location: PACU    Anesthesia Type: spinal    Transport from OR: Transported from OR on room air with adequate spontaneous ventilation    Post pain: adequate analgesia    Post assessment: no apparent anesthetic complications    Post vital signs: stable    Level of consciousness: awake, alert and oriented    Nausea/Vomiting: no nausea/vomiting    Complications: none    Transfer of care protocol was followed      Last vitals:   Visit Vitals  BP (!) 142/83 (BP Location: Right arm, Patient Position: Lying)   Temp 36.9 °C (98.5 °F) (Oral)   Resp 16   Ht 5' 5" (1.651 m)   Wt (!) 151 kg (333 lb)   LMP 10/05/2022   Breastfeeding No   BMI 55.41 kg/m²     "

## 2023-06-22 NOTE — ANESTHESIA PROCEDURE NOTES
Spinal    Diagnosis: C/S, Diabetes and Hypertension  Patient location during procedure: OB  Start time: 6/22/2023 2:10 PM  Timeout: 6/22/2023 2:05 PM  End time: 6/22/2023 2:10 PM    Staffing  Authorizing Provider: César Dai MD  Performing Provider: César Dai MD    Preanesthetic Checklist  Completed: patient identified, IV checked, site marked, risks and benefits discussed, surgical consent, monitors and equipment checked, pre-op evaluation and timeout performed  Spinal Block  Patient position: sitting  Prep: Betadine and ChloraPrep  Patient monitoring: heart rate, continuous pulse ox, frequent blood pressure checks and cardiac monitor  Approach: midline  Location: L3-4  Injection technique: single shot  CSF Fluid: clear free-flowing CSF  Needle  Needle type: Linda   Needle gauge: 25 G  Needle length: 3.5 in  Additional Documentation: negative aspiration for heme and no paresthesia on injection  Needle localization: anatomical landmarks  Assessment  Sensory level: T4   Dermatomal levels determined by pinch or prick and alcohol wipe  Ease of block: easy  Patient's tolerance of the procedure: comfortable throughout block and no complaints  Additional Notes  She comes to [facility] L&D OR for the noted procedure under SAB.       SA sp id'ed, +CSF Free flow, injected Sp.César.(1.7mls) + [id number] and Sub.10mcg w/o diff.  Returned to [facility] for rodrigues,prep and drape.  Tolerated well. Appears to have adq. Blk for planned procedure.   Medications:    Medications: bupivacaine (pf) (MARCAINE) injection 0.75% - Intraspinal   1.7 mL - 6/22/2023 2:10:00 PM

## 2023-06-22 NOTE — OP NOTE
Section Procedure Note    Procedure: Repeat Low Transverse  Section via Pfannensteil skin incision, vacuum assistance for delivery    Indications: previous uterine incision low transverse x2 and type 2 DM, CHTN, MFM recommends delivery    Pre-operative Diagnosis:   IUP at 37 week 1 day pregnancy  CHTN: on Labetalol 200mg BID, ASA 81mg  T2DM: Currently taking Humalin  and Lispro 24 with dinner and 24 at bedtime. Per Dr. Myles Humalog  and NPH /-- accucheck 59 at time of case  Hypothyroidism: On synthroid 25mcg. Most recent TSH unknown  Fetal Left ventricular hypertrophy: Fetal echo (2023) shows concentric LVH without obstructs. Normal RV. VSD cannot be ruled out.  History of CD x2  Polyhydramnios: IVON 27 on 2023  Rubella non immune status: MMR postpartum   Class III Obesity (BMI 55)    Post-operative Diagnosis: same    Surgeon: Viridiana Avina MD    Assistants: Fina Machado MD    Anesthesia: Spinal anesthesia    Findings:  Viable female infant in cephalic position weighing 3685 grams. APGARS awarded of 7/9 at one and five minutes respectively.Uterus  adherent and scarred to anterior abdominal wall unable to visualize uterine fundus or adnexa due to body habitus and techinical difficulty of the case. Two scrub techs for retraction and second surgeon called for the case.  Significant bleeding prior to delivery however after third stage, and hysterotomy closure, hemostasis noted. Urine clear yellow in rodrigues. Appendix was visible and normal appearance during closure of the fascia plane.   Normal appearing and intact placenta. Hemostasis at conclusion of case. Flip potato powder was used for hemostasis over the hysterotomy closure.   Discussed concerns for future pregnancy with patient and her mother siting the significant scarring, bleeding and technical difficulty of delivery. Worry about safe surgery in future and/or risk of  hysterectomy and would consider delivery at  tertiary care center like [facility] with GYN ONC availability.     Quantitative Blood Loss:  1300 mL           Total IV Fluids:  LR per anesthesia     UOP: 200 mL clear yellow urine in rodrigues catheter    Specimens: placenta and cord blood and cord gas segment sent    PreOp CBC:   Lab Results   Component Value Date    WBC 6.13 2023    HGB 11.7 (L) 2023    HCT 38.9 2023    MCV 74.2 (L) 2023     2023                     Complications:   thick scarring of uterus to anterior abdominal wall, unable to visualize uterine fundus or adnexa           Disposition: PACU - hemodynamically stable.           Condition: stable    Antibiotics:  Ancef 3g    Procedure Details   The patient was seen in the Holding Room. The risks, benefits, complications, treatment options, and expected outcomes were discussed with the patient.  The patient concurred with the proposed plan, giving informed consent.  The site of surgery properly noted/marked. The patient was taken to Operating Room, identified as Estephanie Johansen and the procedure verified as repeat  Delivery. A Time Out was held and the above information confirmed.    After induction of anesthesia, the patient was prepped and draped in the usual sterile manner while placed in a dorsal supine position with a left lateral tilt.  A rodrigues catheter was also placed per nursing. Preoperative antibiotics per above were administered and an allis test was performed yielding adequate anesthesia.  A Pfannenstiel incision was made and carried down through the subcutaneous tissue to the fascia. Fascial incision was made and extended transversely. The fascia was grasped with Kocher clamps and  from the underlying rectus tissue superiorly and inferiorly. Extremely difficult dissection due to patient body habitus, technically difficult.  At this point, the rectus was  in the midline with significant bleeding noted. The fetal  head was ballotable behind this tissue plane, giving indication that uterus was densely adherent to this layer. Careful attention was to try to identify bladder and the vesico-uterine peritoneum and using two allis clamps to tent up on this bleeding densely adhered tissues, a low transverse uterine incision was continued with knife and extended bluntly then further with bandage scissors because the tissue was so densely scarred it was difficult to extend without sharp instruments. The amniotic sac was ruptured then and the infant was noted to be in cephalic position. The head was brought to the incision and elevated out of the pelvis. The patient delivered a single viable female infant with difficulty as it was very difficult to deliver the infant due to maternal habitus (obesity and scarring of tissues). Using the bovie the muscle was transected and with a vaccuum extractor, 3 pulls 2 pop offs, the infant was delivered.  Infant weight and APGARS given per above.  After the umbilical cord was clamped and cut cord blood was obtained for evaluation. The placenta was removed intact and appeared normal. The uterus unable to be exteriorized. The uterine incision was closed with running locked sutures of 0 monocryl in one layer. Hemostasis was observed. The uterus was returned to the abdominal cavity. Incision was reinspected and good hemostasis was noted, Flip applied to this. The fascia was then reapproximated with running sutures of 0 loop PDS meeting in middle coming from both angles.  The subcutaneous fat was irrigated and made hemostatic, reapproximated with 2-0 plain gut suture and the skin was reapproximated with Stratafix Monocryl 4-0 suture. A Prevena wound vac was placed over the closure of the skin for extra prophylaxis for wound healing.    Instrument, sponge, and needle counts were correct prior the abdominal closure and at the conclusion of the case. John draining clear yellow urine.     Pt tolerated  procedure well and Dr. Avina discussed with family that pt was stable and in good condition after the procedure, again discussing increased risks with future surgery.     Viridiana Avina MD  OB/GYN Hospitalist  6/22/2023  4:05 PM

## 2023-06-23 LAB
ALBUMIN SERPL-MCNC: 2.4 G/DL (ref 3.5–5)
ALBUMIN/GLOB SERPL: 0.8 RATIO (ref 1.1–2)
ALP SERPL-CCNC: 115 UNIT/L (ref 40–150)
ALT SERPL-CCNC: 8 UNIT/L (ref 0–55)
AST SERPL-CCNC: 17 UNIT/L (ref 5–34)
BASOPHILS # BLD AUTO: 0.03 X10(3)/MCL
BASOPHILS NFR BLD AUTO: 0.5 %
BILIRUBIN DIRECT+TOT PNL SERPL-MCNC: 0.2 MG/DL
BUN SERPL-MCNC: 3.8 MG/DL (ref 7–18.7)
CALCIUM SERPL-MCNC: 8.5 MG/DL (ref 8.4–10.2)
CHLORIDE SERPL-SCNC: 104 MMOL/L (ref 98–107)
CO2 SERPL-SCNC: 24 MMOL/L (ref 22–29)
CREAT SERPL-MCNC: 0.53 MG/DL (ref 0.55–1.02)
EOSINOPHIL # BLD AUTO: 0.12 X10(3)/MCL (ref 0–0.9)
EOSINOPHIL NFR BLD AUTO: 1.8 %
ERYTHROCYTE [DISTWIDTH] IN BLOOD BY AUTOMATED COUNT: 18.8 % (ref 11.5–17)
GFR SERPLBLD CREATININE-BSD FMLA CKD-EPI: >60 MLS/MIN/1.73/M2
GLOBULIN SER-MCNC: 3.1 GM/DL (ref 2.4–3.5)
GLUCOSE SERPL-MCNC: 125 MG/DL (ref 74–100)
HCT VFR BLD AUTO: 32.5 % (ref 37–47)
HGB BLD-MCNC: 9.8 G/DL (ref 12–16)
IMM GRANULOCYTES # BLD AUTO: 0.03 X10(3)/MCL (ref 0–0.04)
IMM GRANULOCYTES NFR BLD AUTO: 0.5 %
LYMPHOCYTES # BLD AUTO: 1.16 X10(3)/MCL (ref 0.6–4.6)
LYMPHOCYTES NFR BLD AUTO: 17.6 %
MCH RBC QN AUTO: 22.5 PG (ref 27–31)
MCHC RBC AUTO-ENTMCNC: 30.2 G/DL (ref 33–36)
MCV RBC AUTO: 74.7 FL (ref 80–94)
MONOCYTES # BLD AUTO: 0.72 X10(3)/MCL (ref 0.1–1.3)
MONOCYTES NFR BLD AUTO: 10.9 %
NEUTROPHILS # BLD AUTO: 4.54 X10(3)/MCL (ref 2.1–9.2)
NEUTROPHILS NFR BLD AUTO: 68.7 %
NRBC BLD AUTO-RTO: 0 %
PLATELET # BLD AUTO: 212 X10(3)/MCL (ref 130–400)
PMV BLD AUTO: 11.4 FL (ref 7.4–10.4)
POCT GLUCOSE: 121 MG/DL (ref 70–110)
POCT GLUCOSE: 122 MG/DL (ref 70–110)
POTASSIUM SERPL-SCNC: 3.7 MMOL/L (ref 3.5–5.1)
PROT SERPL-MCNC: 5.5 GM/DL (ref 6.4–8.3)
RBC # BLD AUTO: 4.35 X10(6)/MCL (ref 4.2–5.4)
SODIUM SERPL-SCNC: 135 MMOL/L (ref 136–145)
WBC # SPEC AUTO: 6.6 X10(3)/MCL (ref 4.5–11.5)

## 2023-06-23 PROCEDURE — 85025 COMPLETE CBC W/AUTO DIFF WBC: CPT | Performed by: OBSTETRICS & GYNECOLOGY

## 2023-06-23 PROCEDURE — 25000003 PHARM REV CODE 250: Performed by: OBSTETRICS & GYNECOLOGY

## 2023-06-23 PROCEDURE — 80053 COMPREHEN METABOLIC PANEL: CPT | Performed by: OBSTETRICS & GYNECOLOGY

## 2023-06-23 PROCEDURE — 11000001 HC ACUTE MED/SURG PRIVATE ROOM

## 2023-06-23 PROCEDURE — 63600175 PHARM REV CODE 636 W HCPCS: Performed by: OBSTETRICS & GYNECOLOGY

## 2023-06-23 RX ADMIN — INSULIN HUMAN 15 UNITS: 100 INJECTION, SOLUTION PARENTERAL at 07:06

## 2023-06-23 RX ADMIN — OXYCODONE AND ACETAMINOPHEN 1 TABLET: 10; 325 TABLET ORAL at 09:06

## 2023-06-23 RX ADMIN — OXYCODONE AND ACETAMINOPHEN 1 TABLET: 10; 325 TABLET ORAL at 05:06

## 2023-06-23 RX ADMIN — OXYCODONE AND ACETAMINOPHEN 1 TABLET: 10; 325 TABLET ORAL at 01:06

## 2023-06-23 RX ADMIN — IBUPROFEN 600 MG: 600 TABLET, FILM COATED ORAL at 12:06

## 2023-06-23 RX ADMIN — CEPHALEXIN 500 MG: 500 CAPSULE ORAL at 09:06

## 2023-06-23 RX ADMIN — METRONIDAZOLE 500 MG: 500 TABLET ORAL at 01:06

## 2023-06-23 RX ADMIN — OXYCODONE AND ACETAMINOPHEN 1 TABLET: 10; 325 TABLET ORAL at 07:06

## 2023-06-23 RX ADMIN — PRENATAL VITAMINS-IRON FUMARATE 27 MG IRON-FOLIC ACID 0.8 MG TABLET 1 TABLET: at 09:06

## 2023-06-23 RX ADMIN — LEVOTHYROXINE SODIUM 25 MCG: 25 TABLET ORAL at 06:06

## 2023-06-23 RX ADMIN — METRONIDAZOLE 500 MG: 500 TABLET ORAL at 09:06

## 2023-06-23 RX ADMIN — LABETALOL HYDROCHLORIDE 200 MG: 200 TABLET, FILM COATED ORAL at 04:06

## 2023-06-23 RX ADMIN — OXYCODONE AND ACETAMINOPHEN 1 TABLET: 10; 325 TABLET ORAL at 02:06

## 2023-06-23 RX ADMIN — INSULIN HUMAN 12 UNITS: 100 INJECTION, SUSPENSION SUBCUTANEOUS at 09:06

## 2023-06-23 RX ADMIN — CEPHALEXIN 500 MG: 500 CAPSULE ORAL at 01:06

## 2023-06-23 RX ADMIN — LABETALOL HYDROCHLORIDE 200 MG: 200 TABLET, FILM COATED ORAL at 09:06

## 2023-06-23 RX ADMIN — INSULIN HUMAN 30 UNITS: 100 INJECTION, SUSPENSION SUBCUTANEOUS at 06:06

## 2023-06-23 RX ADMIN — DOCUSATE SODIUM 200 MG: 100 CAPSULE, LIQUID FILLED ORAL at 09:06

## 2023-06-23 RX ADMIN — IBUPROFEN 600 MG: 600 TABLET, FILM COATED ORAL at 05:06

## 2023-06-23 RX ADMIN — CEPHALEXIN 500 MG: 500 CAPSULE ORAL at 05:06

## 2023-06-23 RX ADMIN — METRONIDAZOLE 500 MG: 500 TABLET ORAL at 05:06

## 2023-06-23 RX ADMIN — IBUPROFEN 600 MG: 600 TABLET, FILM COATED ORAL at 06:06

## 2023-06-23 NOTE — PLAN OF CARE
Problem: Adult Inpatient Plan of Care  Goal: Plan of Care Review  Outcome: Ongoing, Progressing  Goal: Patient-Specific Goal (Individualized)  Outcome: Ongoing, Progressing  Goal: Absence of Hospital-Acquired Illness or Injury  Outcome: Ongoing, Progressing  Goal: Optimal Comfort and Wellbeing  Outcome: Ongoing, Progressing  Goal: Readiness for Transition of Care  Outcome: Ongoing, Progressing     Problem: Bariatric Environmental Safety  Goal: Safety Maintained with Care  Outcome: Ongoing, Progressing     Problem:  Fall Injury Risk  Goal: Absence of Fall, Infant Drop and Related Injury  Outcome: Ongoing, Progressing     Problem: Diabetes Comorbidity  Goal: Blood Glucose Level Within Targeted Range  Outcome: Ongoing, Progressing     Problem: Infection  Goal: Absence of Infection Signs and Symptoms  Outcome: Ongoing, Progressing

## 2023-06-23 NOTE — CONSULTS
Received consult to provide transportation resources due to baby being transferred to facility in [place]. Met with mother in postpartum room. Mother reports that baby's surgery is complete and that she is reported to doing well. Provided family with support, mother appears to be in good spirits given situation. Mother reports that she has already talked to staff at [facility] and that she will be able to stay with baby there. Mother reports that her mother will be giving her a ride to [facility] and that she does have other family members willing to assist with transportation. Provided mother with transportation resources and parenting resource packet. Mother denied any further needs or questions. No further needs identified. Updated ELOINA Kruse.

## 2023-06-23 NOTE — CONSULTS
"Inpatient Nutrition Evaluation    Admit Date: 2023   Total duration of encounter: 1 day    Nutrition Recommendation/Prescription     Continue Diabetic diet as tolerated  Educated pt on diabetic diet    Nutrition Assessment     Chart Review    Reason Seen: physician consult for DM education    Malnutrition Screening Tool Results                Diagnosis:  37w0d here for RLTCS    Relevant Medical History: chronic HTN, Hypothyroidism, obesity, Type 2 DM    Nutrition-Related Medications: cephalexin, docusate, insulin NPH, regular insulin, levothyroxine, metronidazole, Prenatal vitamin    Nutrition-Related Labs:  : H/H-9.8/32.5, Na-135, Bun-3.8, Crea-0.53, Gluc-125    Diet Order: Diet diabetic  Oral Supplement Order: none  Appetite/Oral Intake: good/% of meals  Factors Affecting Nutritional Intake: none identified  Food/[place]/Cultural Preferences: none reported  Food Allergies: no known food allergies       Wound(s):   sx incision    Comments    : Pt s/p . Pt just took bath and was feeling much better. Consulted for Dm diet education. Pt familiar with diet already.     Anthropometrics    Height: 5' 5" (165.1 cm)    Last Weight: (!) 151 kg (333 lb) (23 0751)    BMI (Calculated): 55.4  BMI Classification: not applicable-s/p prenancy     Ideal Body Weight (IBW), Female: 125 lb     % Ideal Body Weight, Female (lb): 266.4 %                             Usual Weight Provided By: EMR weight history    Wt Readings from Last 5 Encounters:   23 (!) 151 kg (333 lb)   23 (!) 150.1 kg (331 lb)   23 (!) 150.1 kg (331 lb)   23 (!) 150.1 kg (331 lb)   23 (!) 148.8 kg (328 lb)     Weight Change(s) Since Admission:  Admit Weight: (!) 151 kg (333 lb) (23 0751)  151kg-expect wt loss post partum.     Patient Education    Education Provided: diabetic diet  Teaching Method: explanation and printed materials  Comprehension: verbalizes understanding  Barriers to Learning: " none evident  Expected Compliance: good  Comments: All questions were answered and dietitian's contact information was provided.     Monitoring & Evaluation     Dietitian will monitor food and beverage intake and food/nutrition knowledge skill.  Nutrition Risk/Follow-Up: low (follow-up in 5-7 days)  Patients assigned 'low nutrition risk' status do not qualify for a full nutritional assessment but will be monitored and re-evaluated in a 5-7 day time period. Please consult if re-evaluation needed sooner.    General Sunscreen Counseling: I recommended a broad spectrum sunscreen with a SPF of 30 or higher.  I explained that SPF 30 sunscreens block approximately 97 percent of the sun's harmful rays.  Sunscreens should be applied at least 15 minutes prior to expected sun exposure and then every 2 hours after that as long as sun exposure continues. If swimming or exercising sunscreen should be reapplied every 45 minutes to an hour after getting wet or sweating.  One ounce, or the equivalent of a shot glass full of sunscreen, is adequate to protect the skin not covered by a bathing suit. I also recommended a lip balm with a sunscreen as well. Sun protective clothing can be used in lieu of sunscreen but must be worn the entire time you are exposed to the sun's rays. Detail Level: Detailed

## 2023-06-23 NOTE — PROGRESS NOTES
Delivery Progress Note  Obstetrics        SUBJECTIVE:     Postpartum Day 1:  Delivery    Ms. Johansen states she feels well. She denies emotional concerns. Her pain is well controlled with current medications. The patient is ambulating well. Ms. Johansen is tolerating a normal diet. Flatus has not been passed. Urinary output is adequate.    OBJECTIVE:     Vital Signs (Most Recent):  Temp: 98 °F (36.7 °C) (23)  Pulse: 87 (23)  Resp: 20 (23)  BP: (!) 108/58 (23 08)  SpO2: 97 % (23 1810)    Vital Signs Range (Last 24H):  Temp:  [97.1 °F (36.2 °C)-99.2 °F (37.3 °C)]   Pulse:  []   Resp:  [15-28]   BP: (108-156)/(58-88)   SpO2:  [97 %-100 %]     I & O (Last 24H):  Intake/Output Summary (Last 24 hours) at 2023 0853  Last data filed at 2023 0200  Gross per 24 hour   Intake 150 ml   Output 1850 ml   Net -1700 ml     Physical Exam:  General:    no distress   Lungs:  clear to auscultation bilaterally   Heart:  regular rate and rhythm, S1, S2 normal, no murmur, click, rub or gallop   Breasts:  patient is lactating, no areas of erythema or tenderness noted, nipples are normal   Abdomen:  soft, non-tender; bowel sounds normal   Fundus:  firm   Incision:  healing well   Lochia:   scant rubra   DVT Evaluation:  No evidence of DVT on either side seen on physical exam.     Hemoglobin/Hematocrit  Recent Labs   Lab 23  0518   HGB 9.8*   HCT 32.5*     ABO/Rh  Lab Results   Component Value Date    GROUPTRH A POS 2023     Rubella  No results for input(s): RUBELLAIGGSC in the last 168 hours.    ASSESSMENT/PLAN:     Status post  section. Doing well postoperatively.     Continue current care.

## 2023-06-23 NOTE — ANESTHESIA POSTPROCEDURE EVALUATION
Anesthesia Post Evaluation    Patient: Estephanie Johansen    Procedure(s) Performed: Procedure(s) (LRB):   SECTION (N/A)    Final Anesthesia Type: spinal      Patient location during evaluation: floor  Patient participation: Yes- Able to Participate  Level of consciousness: awake and alert and oriented  Post-procedure vital signs: reviewed and stable  Pain management: adequate  Airway patency: patent    PONV status at discharge: No PONV  Anesthetic complications: no      Cardiovascular status: blood pressure returned to baseline  Respiratory status: unassisted, spontaneous ventilation and room air  Hydration status: euvolemic  Follow-up not needed.  Comments: Denies headache, mod-severe backpain, or lower extremity motor weekness          Vitals Value Taken Time   /58 23 0830   Temp 36.7 °C (98 °F) 23 0830   Pulse 87 23 0830   Resp 20 23 0830   SpO2 97 % 23 1810         Event Time   Out of Recovery 16:25:00         Pain/Mini Score: Pain Rating Prior to Med Admin: 7 (2023  7:49 AM)  Pain Rating Post Med Admin: 3 (2023  9:36 PM)  Mini Score: 10 (2023  5:20 PM)

## 2023-06-24 VITALS
SYSTOLIC BLOOD PRESSURE: 132 MMHG | WEIGHT: 293 LBS | BODY MASS INDEX: 48.82 KG/M2 | OXYGEN SATURATION: 97 % | RESPIRATION RATE: 17 BRPM | HEART RATE: 89 BPM | TEMPERATURE: 98 F | DIASTOLIC BLOOD PRESSURE: 78 MMHG | HEIGHT: 65 IN

## 2023-06-24 LAB
POCT GLUCOSE: 115 MG/DL (ref 70–110)
POCT GLUCOSE: 90 MG/DL (ref 70–110)

## 2023-06-24 PROCEDURE — 63600175 PHARM REV CODE 636 W HCPCS: Performed by: OBSTETRICS & GYNECOLOGY

## 2023-06-24 PROCEDURE — 25000003 PHARM REV CODE 250: Performed by: OBSTETRICS & GYNECOLOGY

## 2023-06-24 RX ORDER — LEVOTHYROXINE SODIUM 25 UG/1
25 TABLET ORAL
Qty: 30 TABLET | Refills: 0 | Status: SHIPPED | OUTPATIENT
Start: 2023-06-24

## 2023-06-24 RX ORDER — NEBULIZER AND COMPRESSOR
1 EACH MISCELLANEOUS 2 TIMES DAILY
Qty: 1 EACH | Refills: 0 | Status: SHIPPED | OUTPATIENT
Start: 2023-06-24 | End: 2023-06-24 | Stop reason: SDUPTHER

## 2023-06-24 RX ORDER — OXYCODONE AND ACETAMINOPHEN 7.5; 325 MG/1; MG/1
1 TABLET ORAL EVERY 4 HOURS PRN
Qty: 20 TABLET | Refills: 0 | Status: SHIPPED | OUTPATIENT
Start: 2023-06-24 | End: 2023-06-24 | Stop reason: SDUPTHER

## 2023-06-24 RX ORDER — LABETALOL 200 MG/1
200 TABLET, FILM COATED ORAL 3 TIMES DAILY
Qty: 90 TABLET | Refills: 0 | Status: SHIPPED | OUTPATIENT
Start: 2023-06-24

## 2023-06-24 RX ORDER — IBUPROFEN 600 MG/1
600 TABLET ORAL EVERY 6 HOURS PRN
Qty: 20 TABLET | Refills: 0 | Status: SHIPPED | OUTPATIENT
Start: 2023-06-24

## 2023-06-24 RX ORDER — NEBULIZER AND COMPRESSOR
1 EACH MISCELLANEOUS 2 TIMES DAILY
Qty: 1 EACH | Refills: 0 | Status: SHIPPED | OUTPATIENT
Start: 2023-06-24

## 2023-06-24 RX ORDER — DOCUSATE SODIUM 100 MG/1
100 CAPSULE, LIQUID FILLED ORAL 2 TIMES DAILY
Qty: 10 CAPSULE | Refills: 0 | Status: SHIPPED | OUTPATIENT
Start: 2023-06-24 | End: 2023-06-24 | Stop reason: SDUPTHER

## 2023-06-24 RX ORDER — IBUPROFEN 600 MG/1
600 TABLET ORAL EVERY 6 HOURS PRN
Qty: 20 TABLET | Refills: 0 | Status: SHIPPED | OUTPATIENT
Start: 2023-06-24 | End: 2023-06-24 | Stop reason: SDUPTHER

## 2023-06-24 RX ORDER — OXYCODONE AND ACETAMINOPHEN 7.5; 325 MG/1; MG/1
1 TABLET ORAL EVERY 4 HOURS PRN
Qty: 20 TABLET | Refills: 0 | Status: SHIPPED | OUTPATIENT
Start: 2023-06-24

## 2023-06-24 RX ORDER — DOCUSATE SODIUM 100 MG/1
100 CAPSULE, LIQUID FILLED ORAL 2 TIMES DAILY
Qty: 10 CAPSULE | Refills: 0 | Status: SHIPPED | OUTPATIENT
Start: 2023-06-24

## 2023-06-24 RX ADMIN — METRONIDAZOLE 500 MG: 500 TABLET ORAL at 05:06

## 2023-06-24 RX ADMIN — SIMETHICONE 80 MG: 80 TABLET, CHEWABLE ORAL at 12:06

## 2023-06-24 RX ADMIN — IBUPROFEN 600 MG: 600 TABLET, FILM COATED ORAL at 12:06

## 2023-06-24 RX ADMIN — LEVOTHYROXINE SODIUM 25 MCG: 25 TABLET ORAL at 05:06

## 2023-06-24 RX ADMIN — DOCUSATE SODIUM 200 MG: 100 CAPSULE, LIQUID FILLED ORAL at 08:06

## 2023-06-24 RX ADMIN — INSULIN HUMAN 15 UNITS: 100 INJECTION, SOLUTION PARENTERAL at 08:06

## 2023-06-24 RX ADMIN — OXYCODONE AND ACETAMINOPHEN 1 TABLET: 10; 325 TABLET ORAL at 06:06

## 2023-06-24 RX ADMIN — IBUPROFEN 600 MG: 600 TABLET, FILM COATED ORAL at 06:06

## 2023-06-24 RX ADMIN — OXYCODONE AND ACETAMINOPHEN 1 TABLET: 10; 325 TABLET ORAL at 12:06

## 2023-06-24 RX ADMIN — CEPHALEXIN 500 MG: 500 CAPSULE ORAL at 12:06

## 2023-06-24 RX ADMIN — LABETALOL HYDROCHLORIDE 200 MG: 200 TABLET, FILM COATED ORAL at 08:06

## 2023-06-24 RX ADMIN — PRENATAL VITAMINS-IRON FUMARATE 27 MG IRON-FOLIC ACID 0.8 MG TABLET 1 TABLET: at 08:06

## 2023-06-24 RX ADMIN — CEPHALEXIN 500 MG: 500 CAPSULE ORAL at 05:06

## 2023-06-24 NOTE — PLAN OF CARE
Problem: Adult Inpatient Plan of Care  Goal: Plan of Care Review  Outcome: Ongoing, Progressing  Goal: Patient-Specific Goal (Individualized)  Outcome: Ongoing, Progressing  Goal: Absence of Hospital-Acquired Illness or Injury  Outcome: Ongoing, Progressing  Goal: Optimal Comfort and Wellbeing  Outcome: Ongoing, Progressing  Goal: Readiness for Transition of Care  Outcome: Ongoing, Progressing     Problem: Bariatric Environmental Safety  Goal: Safety Maintained with Care  Outcome: Ongoing, Progressing     Problem:  Fall Injury Risk  Goal: Absence of Fall, Infant Drop and Related Injury  Outcome: Ongoing, Progressing     Problem: Diabetes Comorbidity  Goal: Blood Glucose Level Within Targeted Range  Outcome: Ongoing, Progressing     Problem: Infection  Goal: Absence of Infection Signs and Symptoms  Outcome: Ongoing, Progressing     Problem: Adjustment to Role Transition (Postpartum  Delivery)  Goal: Successful Maternal Role Transition  Outcome: Ongoing, Progressing     Problem: Bleeding (Postpartum  Delivery)  Goal: Hemostasis  Outcome: Ongoing, Progressing     Problem: Infection (Postpartum  Delivery)  Goal: Absence of Infection Signs and Symptoms  Outcome: Ongoing, Progressing     Problem: Pain (Postpartum  Delivery)  Goal: Acceptable Pain Control  Outcome: Ongoing, Progressing     Problem: Postoperative Nausea and Vomiting (Postpartum  Delivery)  Goal: Nausea and Vomiting Relief  Outcome: Ongoing, Progressing     Problem: Postoperative Urinary Retention (Postpartum  Delivery)  Goal: Effective Urinary Elimination  Outcome: Ongoing, Progressing

## 2023-06-24 NOTE — PROGRESS NOTES
Postpartum Progress Note    Estephanie Johansen is a 28 y.o.  s/p RLTCS currently POD 2.    NAEON. Moderate abdominal pain well controlled on Percocet and ibuprofen. Lochia minimal and decreasing. Ambulating, voiding, and tolerating diet. Passing flatus. Denies fever/chills, n/v, HA, vision changes, dizziness, chest pain, SOB, leg pain, breast pain.      Physical Exam:   Vitals:    23 0016 23 0400 23 0611 23 0713   BP: 124/78 119/79  132/78   Pulse: 89 91  89   Resp:   18 20   Temp: 98.5 °F (36.9 °C) 98.7 °F (37.1 °C)  98.4 °F (36.9 °C)   TempSrc: Oral Oral  Oral   SpO2:       Weight:       Height:           Gen: AAO x 3, NAD, resting in bed comfortably   CV: RRR  Resp: CTAB, breathing comfortably on room air.   Abd: fundus firm palpable at the level of the umbilicus, abdomen soft, and appropriately tender. Bowel sounds present. Incision c/d/I with wound vac in place.  Ext: trace edema, calves non tender and equal in size  Psych: cooperative, normal affect    Labs:  H/H: 11.7/38.9 to 9.8/32.5      Assessment/Plan  28 y.o. female  s/p RLTCS currently POD 2.      - Continue routine post-partum/operative care, continue to monitor  - Abdominal binder applied  - Continue PNV   - H/H stable and appropriate  - Rubella Immune, Rh Positive, Varicella Immune  - Up ad deepthi; Pelvic Rest for 6 weeks.  - DVT PPx - ambulation  - BP well controlled on labetalol 200 mg TID. Continue to monitor  - Insulin regimen in place. Monitor with accuchecks.    Dispo: Likely stable for discharge home on POD 3/4 pending course    Siddharth Medeiros MD  [facility] - PGY-1

## 2023-06-24 NOTE — DISCHARGE SUMMARY
Delivery Discharge Summary  [facility]stetrics    Admission date: 2023  Discharge date: 2023    Admit Dx:   Patient Active Problem List   Diagnosis    Pre-existing essential hypertension complicating pregnancy in third trimester    Pre-existing type 2 diabetes mellitus during pregnancy in third trimester    Significantly increased BMI complicating pregnancy in third trimester    Hypothyroidism during pregnancy in third trimester    Delivery with history of     Concentric left ventricular hypertrophy of fetus affecting antepartum care of mother, fetus 1    Pelvic peritoneal adhesion affecting pregnancy in third trimester      Discharge Dx:    Patient Active Problem List   Diagnosis    Pre-existing essential hypertension complicating pregnancy in third trimester    Pre-existing type 2 diabetes mellitus during pregnancy in third trimester    Significantly increased BMI complicating pregnancy in third trimester    Hypothyroidism during pregnancy in third trimester    Delivery with history of     Concentric left ventricular hypertrophy of fetus affecting antepartum care of mother, fetus 1    Pelvic peritoneal adhesion affecting pregnancy in third trimester     Procedure:  Repeat  section    Hospital Course:  Estephanie Johansen is a 28 y.o. now  female who was admitted on 2023 for delivery. She had the benefit of an epidural and external fetal monitoring. Patient delivered a viable  via CS at 37^1 WGA on 23. Apgars 7/9. There was 1300 cc quantitative blood loss. Please see delivery note for further details. Pt was in stable condition post delivery and was transferred to the Mother-Baby Unit. Her postpartum course was uncomplicated. On the date of discharge, patient's pain is controlled with oral pain medications. No fever or chills. She is ambulating without SOB or CP, and tolerating PO diet without N/V. Good urinary and bowel function. Reported lochia is within  the normal range. Patient has had no complaints or questions that were not addressed during the duration of her stay. Pt in stable condition and requesting discharge on POD 2 because she would like to be with her baby who was sent to [place] for surgical intervention. She feels that she will be able to sufficiently care for herself given that this is her third  section. She is being discharge with a wound vac in place. Strict return to ED precautions were discussed at bedside. She is aware that she is at increased risk for pre-eclampsia given her history of chronic hypertension. She was advised to check her blood pressure at least twice per day, Rx for BP cuff was given prior to discharge. Follow-up instructions were discussed with recommendations to f/u with Dr. Shelia Kam no later than 23 for BP recheck. [facility] will call on 23 with an appointment time and date for later this upcoming week for incision check and wound vac removal. Insulin regimen was discussed as well and patient voices understanding that her regimen has changed as compared to her prenatal regimen.     Physical exam:   Vitals:    23 1223   BP:    Pulse:    Resp: 17   Temp:       Gen: AAO x 3, NAD  HEENT: NCAT, MMM  CV: RRR, no murmurs  Resp: CTAB. Breathing comfortably on room air.   Abd: soft, normoactive bowel sounds, incision c/d/I with overlying wound vac.  : uterus firm at the level of the umbilicus, lochia minimal  Ext: trace edema    Pertinent studies:  Postpartum CBC  Lab Results   Component Value Date    WBC 6.60 2023    HGB 9.8 (L) 2023    HCT 32.5 (L) 2023    MCV 74.7 (L) 2023     2023       Delivery:    Episiotomy:     Lacerations:     Repair suture:     Repair # of packets:     Blood loss (ml):       Birth information:  YOB: 2022   Time of birth: 2:45 PM   Sex: female   Delivery type: , Low Transverse   Gestational Age:  37w1d    Delivery Clinician:      Other providers:       Additional  information:  Forceps:    Vacuum:    Breech:    Observed anomalies      Living?:           APGARS  One minute Five minutes Ten minutes   Skin color:         Heart rate:         Grimace:         Muscle tone:         Breathing:         Totals: 7  9        Placenta: Delivered:       appearance  Labs: No AM labs. Prenatal labs reviewed   H&H: 9.8/32.5    Disposition: To home, self care    Follow Up:    Follow-up Information       Shelia Kam MD. Schedule an appointment as soon as possible for a visit.    Specialty: Obstetrics and Gynecology  Why: Schedule post-delivery f/u appointment with Dr. Shelia Kam no later than 6/30/23.  Contact information:  [address] [place] [id number]  [phone]               Postpartum, [facility] Follow up.    Why: Office will call Monday (6/26/23) with appointment time and date.                          Patient Instructions:     1. Report to OB ED or call clinic for any bleeding >2 pads/hour for >2 hours, temperature >100.4, foul smelling discharge, pain uncontrolled with medications, incision with warmth or drainage,or or for any other concerns.  2. Pelvic rest x6 weeks and no tub baths x2 weeks  3. Rx for Percocet, Motrin and Colace provided. No driving while on narcotics.  4. Discussed post-partum insulin regimen, patient voices understanding. Rx for NPH insulin and regular insulin provided.   5. Advised to check BP at least twice per day. Rx for BP cuff provided.  6. Continue taking home Synthroid 25 mcg and labetalol 200 mg TID. Rx provided for both.   7. [facility]. F/u with primary OB, Dr. Shelia Kam, for BP check with 1 week of discharge.     Current Discharge Medication List        START taking these medications    Details   BLOOD PRESSURE CUFF Misc 1 each by Misc.(Non-Drug; Combo Route) route 2 (two) times a day.  Qty: 1 each,  "Refills: 0      docusate sodium (COLACE) 100 MG capsule Take 1 capsule (100 mg total) by mouth 2 (two) times daily.  Qty: 10 capsule, Refills: 0      ibuprofen (ADVIL,MOTRIN) 600 MG tablet Take 1 tablet (600 mg total) by mouth every 6 (six) hours as needed for Pain.  Qty: 20 tablet, Refills: 0      oxyCODONE-acetaminophen (PERCOCET) 7.5-325 mg per tablet Take 1 tablet by mouth every 4 (four) hours as needed for Pain.  Qty: 20 tablet, Refills: 0    Comments: Quantity prescribed more than 7 day supply? No           CONTINUE these medications which have CHANGED    Details   !! insulin  unit/mL injection Inject 30 Units into the skin before breakfast.  Qty: 10 mL, Refills: 0      !! insulin  unit/mL injection Inject 12 Units into the skin every evening.  Qty: 10 mL, Refills: 0      insulin regular 100 unit/mL Inj injection Inject 15 Units into the skin daily with breakfast.  Qty: 10 mL, Refills: 0      labetaloL (NORMODYNE) 200 MG tablet Take 1 tablet (200 mg total) by mouth 3 (three) times daily.  Qty: 90 tablet, Refills: 0    Comments: .  Associated Diagnoses: Hypertension complicating pregnancy, third trimester      levothyroxine (SYNTHROID) 25 MCG tablet Take 1 tablet (25 mcg total) by mouth before breakfast.  Qty: 30 tablet, Refills: 0       !! - Potential duplicate medications found. Please discuss with provider.        CONTINUE these medications which have NOT CHANGED    Details   aspirin 81 MG Chew Take 81 mg by mouth once daily.      BD INSULIN SYRINGE ULTRA-FINE 0.5 mL 31 gauge x 5/16" Syrg Inject 1 Syringe into the skin 4 (four) times daily.      ferrous sulfate (FEOSOL) 325 mg (65 mg iron) Tab tablet Take 1 tablet by mouth once daily.      insulin syringe-needle U-100 1 mL 29 gauge x 1/2" Syrg by Misc.(Non-Drug; Combo Route) route.      !! lancets Misc USE 1 TO CHECK GLUCOSE TO SKIN 4 TIMES DAILY AS DIRECTED      !! LANCETS,THIN MISC by Misc.(Non-Drug; Combo Route) route.      TRUE METRIX " GLUCOSE METER Misc USE TO CHECK GLUCOSE 4 TIMES DAILY AS DIRECTED 1 HOUR AFTER BREAKFAST, LUNCH AND DINNER      TRUE METRIX GLUCOSE TEST STRIP Strp 1 strip 4 (four) times daily.       !! - Potential duplicate medications found. Please discuss with provider.        STOP taking these medications       insulin lispro 100 unit/mL injection Comments:   Reason for Stopping:         insulin lispro protamine-insulin lispro (HUMALOG 75/25) 100 unit/mL (75-25) Susp Comments:   Reason for Stopping:             Siddharth Medeiros MD  [facility] - PGY-1

## 2023-06-26 LAB
ABO + RH BLD: NORMAL
ABO + RH BLD: NORMAL
BLD PROD TYP BPU: NORMAL
BLD PROD TYP BPU: NORMAL
BLOOD UNIT EXPIRATION DATE: NORMAL
BLOOD UNIT EXPIRATION DATE: NORMAL
BLOOD UNIT TYPE CODE: 6200
BLOOD UNIT TYPE CODE: 6200
CROSSMATCH INTERPRETATION: NORMAL
CROSSMATCH INTERPRETATION: NORMAL
DISPENSE STATUS: NORMAL
DISPENSE STATUS: NORMAL
PSYCHE PATHOLOGY RESULT: NORMAL
UNIT NUMBER: NORMAL
UNIT NUMBER: NORMAL

## 2023-06-27 ENCOUNTER — TELEPHONE (OUTPATIENT)
Dept: OBGYN | Facility: CLINIC | Age: 28
End: 2023-06-27
Payer: MEDICAID

## 2023-06-27 NOTE — TELEPHONE ENCOUNTER
Pt declined post-partum visit. Pt stated she spoke with her original OB and they will take follow up.

## 2023-06-28 ENCOUNTER — PATIENT OUTREACH (OUTPATIENT)
Dept: ADMINISTRATIVE | Facility: CLINIC | Age: 28
End: 2023-06-28
Payer: MEDICAID

## 2023-07-13 ENCOUNTER — PATIENT MESSAGE (OUTPATIENT)
Dept: MATERNAL FETAL MEDICINE | Facility: CLINIC | Age: 28
End: 2023-07-13
Payer: MEDICAID
